# Patient Record
Sex: FEMALE | Race: ASIAN | Employment: UNEMPLOYED | URBAN - METROPOLITAN AREA
[De-identification: names, ages, dates, MRNs, and addresses within clinical notes are randomized per-mention and may not be internally consistent; named-entity substitution may affect disease eponyms.]

---

## 2016-10-17 LAB
ANTIBODY SCREEN, EXTERNAL: NEGATIVE
CHLAMYDIA, EXTERNAL: NEGATIVE
HBSAG, EXTERNAL: NEGATIVE
HCT, EXTERNAL: 41.8
HGB, EXTERNAL: 13.8
HIV, EXTERNAL: NEGATIVE
N. GONORRHEA, EXTERNAL: NEGATIVE
RPR, EXTERNAL: NORMAL
RUBELLA, EXTERNAL: NORMAL
TYPE, ABO & RH, EXTERNAL: NORMAL
URINALYSIS, EXTERNAL: NEGATIVE

## 2017-05-01 LAB — GRBS, EXTERNAL: NEGATIVE

## 2017-05-21 ENCOUNTER — HOSPITAL ENCOUNTER (OUTPATIENT)
Age: 32
Setting detail: OBSERVATION
Discharge: HOME | End: 2017-05-21
Attending: STUDENT IN AN ORGANIZED HEALTH CARE EDUCATION/TRAINING PROGRAM | Admitting: OBSTETRICS & GYNECOLOGY
Payer: COMMERCIAL

## 2017-05-21 VITALS
OXYGEN SATURATION: 100 % | SYSTOLIC BLOOD PRESSURE: 114 MMHG | RESPIRATION RATE: 17 BRPM | DIASTOLIC BLOOD PRESSURE: 79 MMHG | TEMPERATURE: 97.8 F | WEIGHT: 185 LBS | BODY MASS INDEX: 29.03 KG/M2 | HEIGHT: 67 IN | HEART RATE: 86 BPM

## 2017-05-21 PROCEDURE — 99218 HC RM OBSERVATION: CPT

## 2017-05-21 PROCEDURE — 59025 FETAL NON-STRESS TEST: CPT | Performed by: OBSTETRICS & GYNECOLOGY

## 2017-05-21 PROCEDURE — 99282 EMERGENCY DEPT VISIT SF MDM: CPT | Performed by: OBSTETRICS & GYNECOLOGY

## 2017-05-21 NOTE — IP AVS SNAPSHOT
Calixto Nam 
 
 
 [address] [place] [address] 
[phone] Patient: Karina Rick MRN: [id number] :1985 You are allergic to the following No active allergies Recent Documentation Height Weight BMI OB Status Smoking Status 1.702 m 83.9 kg 28.98 kg/m2 Pregnant Never Smoker Emergency Contacts Name Discharge Info Relation Home Work Mobile TamRadu pelayo DISCHARGE CAREGIVER [3] Spouse [3] [phone] About your hospitalization You were admitted on:  May 21, 2017 You last received care in the:  [facility] 2 LABOR & DELIVERY You were discharged on:  May 21, 2017 Unit phone number:  [phone] Why you were hospitalized Your primary diagnosis was:  Not on File Providers Seen During Your Hospitalizations Provider Role Specialty Primary office phone Kvng Walker DO Attending Provider Obstetrics & Gynecology [phone] Your Primary Care Physician (PCP) Primary Care Physician Office Phone Office Fax Danie Palmer [phone] Follow-up Information None Current Discharge Medication List  
  
ASK your doctor about these medications Dose & Instructions Dispensing Information Comments Morning Noon Evening Bedtime PNV No12-Iron-FA-DSS-OM-3 29 mg iron-1 mg -50 mg Cpkd Your last dose was: Your next dose is: Take  by mouth. Refills:  0 Discharge Instructions Precauciones en el embarazo: Instrucciones de cuidado - [ Pregnancy Precautions: Care Instructions ] Instrucciones de cuidado No hay jessee manera pedersen de prevenir el trabajo de parto antes de la fecha esperada (trabajo de parto prematuro) o de prevenir la mayoría de otros problemas en el [facility].  Dawood hay cosas que puede hacer para [address] probabilidades de tener un embarazo saludable. Vaya a jose citas, siga los consejos de jones médico y cuídese. Coma deepali y kevin ejercicio (si jones médico lo permite). Y asegúrese de edmond abundante agua. La atención de seguimiento es jessee parte clave de jones tratamiento y seguridad. Asegúrese de hacer y acudir a todas las citas, y llame a jones médico si está teniendo problemas. También es jessee buena idea saber los resultados de los exámenes y mantener jessee lista de los medicamentos que steve. Cómo puede cuidarse en el hogar? · Asegúrese de asistir a las citas prenatales. Jones médico le tomará la presión arterial en cada consulta. Jones médico también comprobará si tiene proteínas en jones orina. Tanto la presión arterial manuel randell la presencia de proteínas en la orina son señales de preeclampsia. Esta afección puede ser peligrosa tanto para usted randell para jones bebé. · Jannette abundantes líquidos, suficientes para que jones orina sea de color amarillo jose o transparente randell el agua. La deshidratación puede causar contracciones. Si tiene [facility] [employer], el corazón o el hígado y tiene que Edward's líquidos, hable con jones médico antes de aumentar jones consumo. · Notifique a jones médico de inmediato si presenta cualquier síntoma de infección, tales randell: ¨ Ardor cuando orina. ¨ Flujo con mal olor de la vagina. ¨ Comezón en la vagina. ¨ Fiebre sin explicación. ¨ Dolor o sensibilidad inusual en el útero o la parte baja del abdomen. · Aliméntese en forma equilibrada. Incluya muchos alimentos que salina ricos en calcio y elkin. ¨ Entre los alimentos ricos en calcio se incluyen la [employer], el queso, el yogur, Radha Cancel y el brócoli. ¨ Entre los alimentos ricos en elkin se incluyen las sunny aldridge, los [place], las aves, los [place], los frijoles, las uvas pasas, el pan de grano integral y las verduras de hojas verdes. · No fume.  Si necesita ayuda para dejar de fumar, hable con jones médico sobre programas y medicamentos para dejar de fumar. Estos pueden aumentar jose probabilidades de dejar el hábito para siempre. · No tyler alcohol ni use drogas ilegales. · Siga las instrucciones de jones médico acerca de la [place]. Jones médico le dirá cuánto ejercicio puede hacer. · Pregúntele a jones médico si puede tener [employer]. Si usted está en riesgo de tener trabajo de [place], jones médico podría pedirle que no tenga relaciones sexuales. · [place] precauciones para prevenir las caídas. Juan J el embarazo las articulaciones están más sueltas y se tiene menos equilibrio. Los deportes tales randell el ciclismo, el esquí o el patinaje en línea pueden aumentar el riesgo de caídas. Y no monte a andrade, sosa en motocicleta, kevin clavados, kevin esquí acuático, bucee, ni salte en paracaídas mientras está embarazada. · Evite calentarse demasiado. No use saunas ni bañeras de hidromasaje. Evite la exposición al sol en climas calientes por mucho tiempo. [place] acetaminofén (Tylenol) para bajar jessee fiebre manuel. · No tome medicamentos de venta kika, productos herbarios ni suplementos sin hablar korey con jones médico o farmacéutico. 

Cuándo debe pedir ayuda? Llame al 911 en cualquier momento que considere que necesita atención de [place]. Por ejemplo, llame si: 
· Se desmayó (perdió el conocimiento). · Tiene sangrado vaginal intenso. · Tiene dolor intenso en el vientre o la pelvis. · Le sale abundante líquido o gotea de la vagina y sabe o raman que el cordón umbilical se está saliendo a jones vagina. Si esto sucede, arrodíllese de inmediato, de cari forma que jose nalgas estén más altas que jones marlyn. [place] disminuirá la presión sobre el cordón umbilical hasta que llegue la [place]. Llame a jones médico ahora mismo o busque atención médica inmediata si: · Tiene señales de preeclampsia, tales randell: ¨ Se le hinchan de manera repentina la fantasma, las rock o los pies. ¨ Problemas nuevos con la visión (randell oscurecimiento de la visión o visión borrosa). ¨ Dolor de marlyn intenso. · Tiene cualquier sangrado vaginal. 
· Tiene dolor abdominal o cólicos. · Tiene fiebre. · Ha tenido contracciones regulares (con o sin dolor) por [place] . [place] significa que tiene 8 o más contracciones en 1 hora o que tiene 4 contracciones o más en 20 minutos después de [place] de posición y edmond líquidos. · Tiene jessee pérdida repentina de líquido por la vagina. · Tiene dolor en la parte baja de la espalda o presión en la pelvis que no desaparece. · Nota que jones bebé ha dejado de moverse o se mueve mucho menos de lo normal. 
Preste especial atención a los cambios en jones kiersten y asegúrese de comunicarse con jones médico si tiene algún problema. Dónde puede encontrar más información en inglés? Lizet Watt a [email or web address]-nola.info/. Heather Lewis Y261 en la búsqueda para aprender más acerca de \"Precauciones en el embarazo: Instrucciones de cuidado - [ Pregnancy Precautions: Care Instructions ]. \" 
Revisado: [address], 2016 Versión del contenido: 11.2 © [id number] Healthwise, Incorporated. Las instrucciones de cuidado fueron adaptadas bajo licencia por Good [employer] Connections (which disclaims liability or warranty for this information). Si usted tiene Isabella Avera afección médica o sobre estas instrucciones, siempre pregunte a jones profesional de kiersten. Healthwise, Incorporated niega toda garantía o responsabilidad por jones uso de esta información. Semana 39 de jones embarazo: Instrucciones de cuidado - [ Week 44 of Your Pregnancy: Care Instructions ] Instrucciones de cuidado Juan J estas últimas semanas, podría sentirse ansiosa por magdy a jones nuevo bebé. Los bebés recién nacidos suelen tener un aspecto distinto al que ve en fotografías o películas. Inmediatamente después del nacimiento, es posible que la marlyn tenga jessee forma extraña.  Los ojos [employer] inflamados. Y los genitales cari vez estén hinchados. Además, podrían tener la piel muy seca o marcas rojizas en los párpados, la nariz o el don. De todos modos, la mayoría de los padres creen que jose bebés son hermosos. La atención de seguimiento es jessee parte clave de jones tratamiento y seguridad. Asegúrese de hacer y acudir a todas las citas, y llame a jones médico si está teniendo problemas. También es jessee buena idea saber los resultados de los exámenes y mantener jessee lista de los medicamentos que steve. Cómo puede cuidarse en el hogar? Prepárese para amamantar · Si amamanta, siga comiendo alimentos saludables. · Evite el alcohol, los cigarrillos y las drogas. [place] incluye los medicamentos recetados y los de [employer]. · Puede ayudar a evitar dolor en los pezones alimentando a jones bebé en la posición correcta. Las [employer] ayudarán a aprender [employer]. · Jones bebé recién nacido necesitará alimentarse con jessee frecuencia de 1½ a 3 horas, aproximadamente. Elija el método anticonceptivo correcto después de que nazca el bebé · Las mujeres que están amamantando aún pueden Odella Moron. Use un método anticonceptivo si no quiere quedar embarazada. · Los dispositivos intrauterinos (DIU) son buenos para las mujeres que quieren esperar al menos 2 años antes de volver a Odella Moron. Estos dispositivos son seguros para el período de amamantamiento. · Se puede usar Depo-Provera mientras está amamantando. Es jessee inyección que se aplica cada 3 meses. · Las pastillas anticonceptivas son eficaces. [place] necesitará un tipo de pastilla distinta mientras esté amamantando. Y cuando comience a edmond estas pastillas, debe asegurarse de usar otro método anticonceptivo hasta comenzar a edmond el irvin paquete.  
· Los diafragmas, los capuchones cervicales, los implantes tubáricos y los condones (preservativos) con espermicida son Jackolyn Amber del parto. Si tiene un diafragma o un capuchón cervical, jelly tendrá que ser modificado. · Tanto la ligadura de trompas (atadura de trompas) randell la vasectomía son permanentes. Estas son Rosalina Roses opciones si está pedersen de que ya no va a querer [employer] [place] [employer] hijos. Dónde puede encontrar más información en inglés? Scooter Rodriguez a http://jasson-nola.info/. Juliette Marines J482 en la búsqueda para aprender más acerca de \"Semana 44 de jones embarazo: Instrucciones de cuidado - [ Week 44 of Your Pregnancy: Care Instructions ]. \" 
Revisado: 30 mayo, 2016 Versión del contenido: 11.2 © 5870-8482 Healthwise, Incorporated. Las instrucciones de cuidado fueron adaptadas bajo licencia por Good Help Connections (which disclaims liability or warranty for this information). Si usted tiene [place] afección médica o sobre estas instrucciones, siempre pregunte a jones profesional de kiersten. Healthwise, Incorporated niega toda garantía o responsabilidad por jones uso de esta información. [facility] Activation Thank you for enrolling in [address] Please follow the instructions below to securely access your online medical record. [facility] allows you to send messages to your doctor, view your test results, renew your prescriptions, schedule appointments, and more. How Do I Sign Up? 1. In your internet browser, go to [email or web address] 2. Click on the First Time User? Click Here link in the Sign In box. You will see the New Member Sign Up page. 3. Enter your [facility] Access Code exactly as it appears below. You will not need to use this code after youve completed the sign-up process. If you do not sign up before the expiration date, you must request a new code. [facility] Access Code: [email or web address] Expires: 6/7/2017  1:46 PM  
 
4. Enter the last four digits of your Social Security Number (xxxx) and Date of Birth (mm/dd/yyyy) as indicated and click Submit. You will be taken to the next sign-up page. 5. Create a Vishay Precision Grouphart ID. This will be your Nicholas Haddox Recordst login ID and cannot be changed, so think of one that is secure and easy to remember. 6. Create a Nicholas Haddox Recordst password. You can change your password at any time. 7. Enter your Password Reset Question and Answer. This can be used at a later time if you forget your password. 8. Enter your e-mail address. You will receive e-mail notification when new information is available in [address] 9. Click Sign Up. You can now view your medical record. Additional Information Remember, [email or web address] is NOT to be used for urgent needs. For medical emergencies, dial 911. Now available from your iPhone and Android! Discharge Orders None Introducing [facility]! [employer] introduces [email or web address] patient portal. Now you can access parts of your medical record, email your doctor's office, and request medication refills online. 1. In your internet browser, go to [email or web address] 2. Click on the First Time User? Click Here link in the Sign In box. You will see the New Member Sign Up page. 3. Enter your [email or web address] Access Code exactly as it appears below. You will not need to use this code after youve completed the sign-up process. If you do not sign up before the expiration date, you must request a new code. · [email or web address] Access Code: [email or web address] Expires: 6/7/2017  1:46 PM 
 
4. Enter the last four digits of your Social Security Number (xxxx) and Date of Birth (mm/dd/yyyy) as indicated and click Submit. You will be taken to the next sign-up page. 5. Create a Nicholas Haddox Recordst ID. This will be your [email or web address] login ID and cannot be changed, so think of one that is secure and easy to remember. 6. Create a Nicholas Haddox Recordst password. You can change your password at any time. 7. Enter your Password Reset Question and Answer. This can be used at a later time if you forget your password. 8. Enter your e-mail address.  You will receive e-mail notification when new information is available in FiPatht. 9. Click Sign Up. You can now view and download portions of your medical record. 10. Click the Download Summary menu link to download a portable copy of your medical information. If you have questions, please visit the Frequently Asked Questions section of the [employer] website. Remember, [employer] is NOT to be used for urgent needs. For medical emergencies, dial 911. Now available from your iPhone and Android! General Information Please provide this summary of care documentation to your next provider. Patient Signature:  ____________________________________________________________ Date:  ____________________________________________________________  
  
[facility] Provider Signature:  ____________________________________________________________ Date:  ____________________________________________________________

## 2017-05-21 NOTE — IP AVS SNAPSHOT
Current Discharge Medication List  
  
ASK your doctor about these medications Dose & Instructions Dispensing Information Comments Morning Noon Evening Bedtime PNV No12-Iron-FA-DSS-OM-3 29 mg iron-1 mg -50 mg Cpkd Your last dose was: Your next dose is: Take  by mouth. Refills:  0

## 2017-05-22 ENCOUNTER — ANESTHESIA EVENT (OUTPATIENT)
Dept: LABOR AND DELIVERY | Age: 32
End: 2017-05-22
Payer: COMMERCIAL

## 2017-05-22 ENCOUNTER — HOSPITAL ENCOUNTER (INPATIENT)
Age: 32
LOS: 3 days | Discharge: HOME | End: 2017-05-25
Attending: OBSTETRICS & GYNECOLOGY | Admitting: OBSTETRICS & GYNECOLOGY
Payer: COMMERCIAL

## 2017-05-22 ENCOUNTER — ANESTHESIA (OUTPATIENT)
Dept: LABOR AND DELIVERY | Age: 32
End: 2017-05-22
Payer: COMMERCIAL

## 2017-05-22 ENCOUNTER — HOSPITAL ENCOUNTER (OUTPATIENT)
Age: 32
Setting detail: OBSERVATION
Discharge: HOME | End: 2017-05-22
Attending: STUDENT IN AN ORGANIZED HEALTH CARE EDUCATION/TRAINING PROGRAM | Admitting: OBSTETRICS & GYNECOLOGY
Payer: COMMERCIAL

## 2017-05-22 VITALS
OXYGEN SATURATION: 99 % | HEIGHT: 67 IN | WEIGHT: 185 LBS | BODY MASS INDEX: 29.03 KG/M2 | DIASTOLIC BLOOD PRESSURE: 78 MMHG | RESPIRATION RATE: 12 BRPM | TEMPERATURE: 98.3 F | HEART RATE: 89 BPM | SYSTOLIC BLOOD PRESSURE: 120 MMHG

## 2017-05-22 PROBLEM — Z34.90 PREGNANCY: Status: ACTIVE | Noted: 2017-05-22

## 2017-05-22 LAB
BASOPHILS # BLD AUTO: 0 K/UL (ref 0–0.1)
BASOPHILS # BLD AUTO: 0 K/UL (ref 0–0.1)
BASOPHILS # BLD: 0 % (ref 0–1)
BASOPHILS # BLD: 0 % (ref 0–1)
EOSINOPHIL # BLD: 0 K/UL (ref 0–0.4)
EOSINOPHIL # BLD: 0 K/UL (ref 0–0.4)
EOSINOPHIL NFR BLD: 0 % (ref 0–7)
EOSINOPHIL NFR BLD: 0 % (ref 0–7)
ERYTHROCYTE [DISTWIDTH] IN BLOOD BY AUTOMATED COUNT: 13 % (ref 11.5–14.5)
ERYTHROCYTE [DISTWIDTH] IN BLOOD BY AUTOMATED COUNT: 13.3 % (ref 11.5–14.5)
HCT VFR BLD AUTO: 38.2 % (ref 35–47)
HCT VFR BLD AUTO: 38.5 % (ref 35–47)
HGB BLD-MCNC: 13.1 G/DL (ref 11.5–16)
HGB BLD-MCNC: 13.3 G/DL (ref 11.5–16)
LYMPHOCYTES # BLD AUTO: 11 % (ref 12–49)
LYMPHOCYTES # BLD AUTO: 8 % (ref 12–49)
LYMPHOCYTES # BLD: 1.2 K/UL (ref 0.8–3.5)
LYMPHOCYTES # BLD: 1.3 K/UL (ref 0.8–3.5)
MCH RBC QN AUTO: 33.3 PG (ref 26–34)
MCH RBC QN AUTO: 34.5 PG (ref 26–34)
MCHC RBC AUTO-ENTMCNC: 34 G/DL (ref 30–36.5)
MCHC RBC AUTO-ENTMCNC: 34.8 G/DL (ref 30–36.5)
MCV RBC AUTO: 98 FL (ref 80–99)
MCV RBC AUTO: 99.2 FL (ref 80–99)
MONOCYTES # BLD: 0.5 K/UL (ref 0–1)
MONOCYTES # BLD: 0.7 K/UL (ref 0–1)
MONOCYTES NFR BLD AUTO: 4 % (ref 5–13)
MONOCYTES NFR BLD AUTO: 5 % (ref 5–13)
NEUTS SEG # BLD: 10.6 K/UL (ref 1.8–8)
NEUTS SEG # BLD: 12.9 K/UL (ref 1.8–8)
NEUTS SEG NFR BLD AUTO: 85 % (ref 32–75)
NEUTS SEG NFR BLD AUTO: 87 % (ref 32–75)
PLATELET # BLD AUTO: 260 K/UL (ref 150–400)
PLATELET # BLD AUTO: 263 K/UL (ref 150–400)
RBC # BLD AUTO: 3.85 M/UL (ref 3.8–5.2)
RBC # BLD AUTO: 3.93 M/UL (ref 3.8–5.2)
WBC # BLD AUTO: 12.4 K/UL (ref 3.6–11)
WBC # BLD AUTO: 14.8 K/UL (ref 3.6–11)

## 2017-05-22 PROCEDURE — 36415 COLL VENOUS BLD VENIPUNCTURE: CPT | Performed by: OBSTETRICS & GYNECOLOGY

## 2017-05-22 PROCEDURE — 74011250636 HC RX REV CODE- 250/636: Performed by: ANESTHESIOLOGY

## 2017-05-22 PROCEDURE — 96361 HYDRATE IV INFUSION ADD-ON: CPT

## 2017-05-22 PROCEDURE — 96374 THER/PROPH/DIAG INJ IV PUSH: CPT

## 2017-05-22 PROCEDURE — 10907ZC DRAINAGE OF AMNIOTIC FLUID, THERAPEUTIC FROM PRODUCTS OF CONCEPTION, VIA NATURAL OR ARTIFICIAL OPENING: ICD-10-PCS | Performed by: OBSTETRICS & GYNECOLOGY

## 2017-05-22 PROCEDURE — 74011000258 HC RX REV CODE- 258: Performed by: OBSTETRICS & GYNECOLOGY

## 2017-05-22 PROCEDURE — 99283 EMERGENCY DEPT VISIT LOW MDM: CPT | Performed by: STUDENT IN AN ORGANIZED HEALTH CARE EDUCATION/TRAINING PROGRAM

## 2017-05-22 PROCEDURE — 00HU33Z INSERTION OF INFUSION DEVICE INTO SPINAL CANAL, PERCUTANEOUS APPROACH: ICD-10-PCS | Performed by: ANESTHESIOLOGY

## 2017-05-22 PROCEDURE — 96376 TX/PRO/DX INJ SAME DRUG ADON: CPT

## 2017-05-22 PROCEDURE — 74011250636 HC RX REV CODE- 250/636

## 2017-05-22 PROCEDURE — 74011000250 HC RX REV CODE- 250: Performed by: ANESTHESIOLOGY

## 2017-05-22 PROCEDURE — 75410000002 HC LABOR FEE PER 1 HR: Performed by: OBSTETRICS & GYNECOLOGY

## 2017-05-22 PROCEDURE — 99218 HC RM OBSERVATION: CPT

## 2017-05-22 PROCEDURE — 74011250636 HC RX REV CODE- 250/636: Performed by: OBSTETRICS & GYNECOLOGY

## 2017-05-22 PROCEDURE — 65270000029 HC RM PRIVATE

## 2017-05-22 PROCEDURE — 74011000250 HC RX REV CODE- 250

## 2017-05-22 PROCEDURE — 85025 COMPLETE CBC W/AUTO DIFF WBC: CPT | Performed by: OBSTETRICS & GYNECOLOGY

## 2017-05-22 PROCEDURE — 77030014125 HC TY EPDRL BBMI -B: Performed by: ANESTHESIOLOGY

## 2017-05-22 PROCEDURE — 3E0R3CZ INTRODUCE REGIONAL ANESTH IN SPINAL CANAL, PERC: ICD-10-PCS | Performed by: ANESTHESIOLOGY

## 2017-05-22 PROCEDURE — 74011250637 HC RX REV CODE- 250/637: Performed by: OBSTETRICS & GYNECOLOGY

## 2017-05-22 PROCEDURE — 96375 TX/PRO/DX INJ NEW DRUG ADDON: CPT

## 2017-05-22 RX ORDER — ACETAMINOPHEN 325 MG/1
975 TABLET ORAL
Status: DISCONTINUED | OUTPATIENT
Start: 2017-05-22 | End: 2017-05-23

## 2017-05-22 RX ORDER — NALOXONE HYDROCHLORIDE 0.4 MG/ML
0.4 INJECTION, SOLUTION INTRAMUSCULAR; INTRAVENOUS; SUBCUTANEOUS AS NEEDED
Status: DISCONTINUED | OUTPATIENT
Start: 2017-05-22 | End: 2017-05-22 | Stop reason: HOSPADM

## 2017-05-22 RX ORDER — LIDOCAINE HYDROCHLORIDE AND EPINEPHRINE 20; 5 MG/ML; UG/ML
INJECTION, SOLUTION EPIDURAL; INFILTRATION; INTRACAUDAL; PERINEURAL AS NEEDED
Status: DISCONTINUED | OUTPATIENT
Start: 2017-05-22 | End: 2017-05-23 | Stop reason: HOSPADM

## 2017-05-22 RX ORDER — SODIUM CHLORIDE 0.9 % (FLUSH) 0.9 %
5-10 SYRINGE (ML) INJECTION AS NEEDED
Status: DISCONTINUED | OUTPATIENT
Start: 2017-05-22 | End: 2017-05-22 | Stop reason: HOSPADM

## 2017-05-22 RX ORDER — SODIUM CHLORIDE 0.9 % (FLUSH) 0.9 %
5-10 SYRINGE (ML) INJECTION AS NEEDED
Status: DISCONTINUED | OUTPATIENT
Start: 2017-05-22 | End: 2017-05-23

## 2017-05-22 RX ORDER — FENTANYL/BUPIVACAINE/NS/PF 2-1250MCG
1-16 PREFILLED PUMP RESERVOIR EPIDURAL CONTINUOUS
Status: DISCONTINUED | OUTPATIENT
Start: 2017-05-22 | End: 2017-05-22 | Stop reason: HOSPADM

## 2017-05-22 RX ORDER — OXYTOCIN IN 5 % DEXTROSE 30/500 ML
.5-2 PLASTIC BAG, INJECTION (ML) INTRAVENOUS
Status: DISCONTINUED | OUTPATIENT
Start: 2017-05-22 | End: 2017-05-23

## 2017-05-22 RX ORDER — SODIUM CHLORIDE, SODIUM LACTATE, POTASSIUM CHLORIDE, CALCIUM CHLORIDE 600; 310; 30; 20 MG/100ML; MG/100ML; MG/100ML; MG/100ML
125 INJECTION, SOLUTION INTRAVENOUS CONTINUOUS
Status: DISCONTINUED | OUTPATIENT
Start: 2017-05-22 | End: 2017-05-23

## 2017-05-22 RX ORDER — OXYTOCIN IN 5 % DEXTROSE 30/500 ML
PLASTIC BAG, INJECTION (ML) INTRAVENOUS
Status: COMPLETED
Start: 2017-05-22 | End: 2017-05-22

## 2017-05-22 RX ORDER — SODIUM CHLORIDE, SODIUM LACTATE, POTASSIUM CHLORIDE, CALCIUM CHLORIDE 600; 310; 30; 20 MG/100ML; MG/100ML; MG/100ML; MG/100ML
125 INJECTION, SOLUTION INTRAVENOUS CONTINUOUS
Status: DISCONTINUED | OUTPATIENT
Start: 2017-05-22 | End: 2017-05-22 | Stop reason: HOSPADM

## 2017-05-22 RX ORDER — OXYTOCIN IN 5 % DEXTROSE 30/500 ML
.5-2 PLASTIC BAG, INJECTION (ML) INTRAVENOUS
Status: DISCONTINUED | OUTPATIENT
Start: 2017-05-22 | End: 2017-05-22

## 2017-05-22 RX ORDER — NALOXONE HYDROCHLORIDE 0.4 MG/ML
0.4 INJECTION, SOLUTION INTRAMUSCULAR; INTRAVENOUS; SUBCUTANEOUS AS NEEDED
Status: DISCONTINUED | OUTPATIENT
Start: 2017-05-22 | End: 2017-05-23

## 2017-05-22 RX ORDER — FENTANYL/BUPIVACAINE/NS/PF 2-1250MCG
1-16 PREFILLED PUMP RESERVOIR EPIDURAL CONTINUOUS
Status: DISCONTINUED | OUTPATIENT
Start: 2017-05-22 | End: 2017-05-23

## 2017-05-22 RX ORDER — BUTORPHANOL TARTRATE 2 MG/ML
2 INJECTION INTRAMUSCULAR; INTRAVENOUS
Status: DISCONTINUED | OUTPATIENT
Start: 2017-05-22 | End: 2017-05-22 | Stop reason: HOSPADM

## 2017-05-22 RX ADMIN — Medication 2 MILLI-UNITS/MIN: at 22:05

## 2017-05-22 RX ADMIN — SODIUM CHLORIDE, SODIUM LACTATE, POTASSIUM CHLORIDE, AND CALCIUM CHLORIDE 999 ML/HR: 600; 310; 30; 20 INJECTION, SOLUTION INTRAVENOUS at 16:46

## 2017-05-22 RX ADMIN — ACETAMINOPHEN 975 MG: 325 TABLET ORAL at 22:16

## 2017-05-22 RX ADMIN — LIDOCAINE HYDROCHLORIDE AND EPINEPHRINE 10 ML: 20; 5 INJECTION, SOLUTION EPIDURAL; INFILTRATION; INTRACAUDAL; PERINEURAL at 18:24

## 2017-05-22 RX ADMIN — FENTANYL 0.2 MG/100ML-BUPIV 0.125%-NACL 0.9% EPIDURAL INJ 12 ML/HR: 2/0.125 SOLUTION at 18:23

## 2017-05-22 RX ADMIN — EPHEDRINE SULFATE 10 MG: 50 INJECTION INTRAMUSCULAR; INTRAVENOUS; SUBCUTANEOUS at 19:08

## 2017-05-22 RX ADMIN — PROMETHAZINE HYDROCHLORIDE 12.5 MG: 25 INJECTION INTRAMUSCULAR; INTRAVENOUS at 01:40

## 2017-05-22 RX ADMIN — BUTORPHANOL TARTRATE 2 MG: 2 INJECTION, SOLUTION INTRAMUSCULAR; INTRAVENOUS at 05:07

## 2017-05-22 RX ADMIN — SODIUM CHLORIDE, SODIUM LACTATE, POTASSIUM CHLORIDE, AND CALCIUM CHLORIDE 125 ML/HR: 600; 310; 30; 20 INJECTION, SOLUTION INTRAVENOUS at 01:30

## 2017-05-22 RX ADMIN — BUTORPHANOL TARTRATE 2 MG: 2 INJECTION, SOLUTION INTRAMUSCULAR; INTRAVENOUS at 01:38

## 2017-05-22 NOTE — DISCHARGE INSTRUCTIONS
Precauciones en el embarazo: Instrucciones de cuidado - [ Pregnancy Precautions: Care Instructions ]  Instrucciones de cuidado  No hay jessee manera pedersen de prevenir el trabajo de parto antes de la fecha esperada (trabajo de parto prematuro) o de prevenir la mayoría de otros problemas en el [facility]. [place] hay cosas que puede hacer para aumentar las probabilidades de tener un embarazo saludable. Vaya a jose citas, siga los consejos de jones médico y cuídese. Coma deepali y kevin ejercicio (si jones médico lo permite). Y asegúrese de edmond abundante agua. La atención de seguimiento es jessee parte clave de jones tratamiento y seguridad. Asegúrese de hacer y acudir a todas las citas, y llame a jones médico si está teniendo problemas. También es jessee buena idea saber los resultados de los exámenes y mantener jessee lista de los medicamentos que steve. ¿Cómo puede cuidarse en el hogar? · Asegúrese de asistir a las citas prenatales. Jones médico le tomará la presión arterial en cada consulta. Jones médico también comprobará si tiene proteínas en jones orina. Tanto la presión arterial manuel randell la presencia de proteínas en la orina son señales de preeclampsia. Esta afección puede ser peligrosa tanto para usted randell para jones bebé. · Jannette abundantes líquidos, suficientes para que jones orina sea de color amarillo jose o transparente randell el agua. La deshidratación puede causar contracciones. Si tiene [employer], el corazón o el hígado y tiene que [place]'s líquidos, hable con jones médico antes de aumentar jones consumo. · Notifique a jones médico de inmediato si presenta cualquier síntoma de infección, tales randell:  ¨ Ardor cuando orina. ¨ Flujo con mal olor de la vagina. ¨ Comezón en la vagina. ¨ Fiebre sin explicación. ¨ Dolor o sensibilidad inusual en el útero o la parte baja del abdomen. · Aliméntese en forma equilibrada. Incluya muchos alimentos que salina ricos en calcio y elkin.   ¨ Entre los alimentos ricos en calcio se incluyen la [place], el queso, el yogur, Estefani Most y el brócoli. ¨ Entre los alimentos ricos en elkin se incluyen las sunny [place]dridge, los [place], las aves, los [place], los frijoles, las uvas pasas, el pan de grano integral y las verduras de hojas verdes. · No fume. Si necesita ayuda para dejar de fumar, hable con jones médico sobre programas y medicamentos para dejar de fumar. Estos pueden aumentar jose probabilidades de dejar el hábito para siempre. · No tyler alcohol ni use drogas ilegales. · Siga las instrucciones de jones médico acerca de la [place]. Jones médico le dirá cuánto ejercicio puede hacer. · Pregúntele a jones médico si puede tener [employer]. Si usted está en riesgo de tener trabajo de [place], jones médico podría pedirle que no tenga relaciones sexuales. · [place] precauciones para prevenir las caídas. Juan J el embarazo las articulaciones están más sueltas y se tiene menos equilibrio. Los deportes tales randell el ciclismo, el esquí o el patinaje en línea pueden aumentar el riesgo de caídas. Y no monte a andrade, sosa en motocicleta, kevin clavados, kevin esquí acuático, bucee, ni salte en paracaídas mientras está embarazada. · Evite calentarse demasiado. No use saunas ni bañeras de hidromasaje. Evite la exposición al sol en climas calientes por mucho tiempo. [place] acetaminofén (Tylenol) para bajar jessee fiebre manuel. · No tome medicamentos de venta kika, productos herbarios ni suplementos sin hablar korey con jones médico o farmacéutico.  ¿Cuándo debe pedir ayuda? Llame al 911 en cualquier momento que considere que necesita atención de [place]. Por ejemplo, llame si:  · Se desmayó (perdió el conocimiento). · Tiene sangrado vaginal intenso. · Tiene dolor intenso en el vientre o la pelvis. · Le sale abundante líquido o gotea de la vagina y sabe o raman que el cordón umbilical se está saliendo a jones vagina.  Si esto sucede, arrodíllese de inmediato, de cari forma que jose nalgas estén más altas que jones marlyn. [place] disminuirá la presión sobre el cordón umbilical hasta que llegue la [place]. Llame a jones médico ahora mismo o busque atención médica inmediata si:  · Tiene señales de preeclampsia, tales randell:  ¨ Se le hinchan de manera repentina la fantasma, las rock o los pies. ¨ Problemas nuevos con la visión (randell oscurecimiento de la visión o visión borrosa). ¨ Dolor de marlyn intenso. · Tiene cualquier sangrado vaginal.  · Tiene dolor abdominal o cólicos. · Tiene fiebre. · Ha tenido contracciones regulares (con o sin dolor) por Renella Romberg. Raoul significa que tiene 8 o más contracciones en 1 hora o que tiene 4 contracciones o más en 20 minutos después de [place] de posición y edmond líquidos. · Tiene jessee pérdida repentina de líquido por la vagina. · Tiene dolor en la parte baja de la espalda o presión en la pelvis que no desaparece. · Nota que jones bebé ha dejado de moverse o se mueve mucho menos de lo normal.  Preste especial atención a los cambios en jones kiersten y asegúrese de comunicarse con jones médico si tiene algún problema. ¿Dónde puede encontrar más información en inglés? Anirudh Medrano a [email or web address]-nola.info/. Katy Landry [id number] en la búsqueda para aprender más acerca de \"Precauciones en el embarazo: Instrucciones de cuidado - [ Pregnancy Precautions: Care Instructions ]. \"  Revisado: 30 cabraels, 2016  Versión del contenido: 11.2  © 5236-3738 [employer]. Las instrucciones de cuidado fueron adaptadas bajo licencia por [employer] (which disclaims liability or warranty for this information). Si usted tiene [place] afección médica o sobre estas instrucciones, siempre pregunte a jones profesional de kiersten. [facility] niega toda garantía o responsabilidad por jones uso de esta información. Semana 39 de jones embarazo:  Instrucciones de cuidado - [ Week 44 of Your Pregnancy: Care Instructions ]  Instrucciones de cuidado    Juan J estas últimas semanas, podría sentirse ansiosa por magdy a jones nuevo bebé. Los bebés recién nacidos suelen tener un aspecto distinto al que ve en fotografías o películas. Inmediatamente después del nacimiento, es posible que la marlyn tenga jessee forma extraña. Los ojos podrían estar inflamados. Y los genitales cari vez estén hinchados. Además, podrían tener la piel muy seca o marcas rojizas en los párpados, la nariz o el don. De todos modos, la mayoría de los padres creen que jose bebés son hermosos. La atención de seguimiento es jessee parte clave de jones tratamiento y seguridad. Asegúrese de hacer y acudir a todas las citas, y llame a jones médico si está teniendo problemas. También es jessee buena idea saber los resultados de los exámenes y mantener jessee lista de los medicamentos que steve. ¿Cómo puede cuidarse en el hogar? Prepárese para amamantar  · Si amamanta, siga comiendo alimentos saludables. · Evite el alcohol, los cigarrillos y las drogas. [place] incluye los medicamentos recetados y los de [employer]. · Puede ayudar a evitar dolor en los pezones alimentando a jones bebé en la posición correcta. Las [place] ayudarán a aprender [employer]. · Jones bebé recién nacido necesitará alimentarse con jessee frecuencia de 1½ a 3 horas, aproximadamente. Elija el método anticonceptivo correcto después de que nazca el bebé  · Las mujeres que están amamantando aún pueden Kriste Gemma. Use un método anticonceptivo si no quiere quedar embarazada. · Los dispositivos intrauterinos (DIU) son buenos para las mujeres que quieren esperar al menos 2 años antes de volver a Kriste Gemma. Estos dispositivos son seguros para el período de amamantamiento. · Se puede usar Depo-Provera mientras está amamantando. Es jessee inyección que se aplica cada 3 meses. · Las pastillas anticonceptivas son eficaces. [place] necesitará un tipo de pastilla distinta mientras esté amamantando.  Y cuando comience a edmond estas pastillas, debe asegurarse de usar [employer] anticonceptivo hasta comenzar a edmond el irvin paquete. · Los diafragmas, los capuchones cervicales, los implantes tubáricos y los condones (preservativos) con espermicida son menos eficaces después del parto. Si tiene un diafragma o un capuchón cervical, jelly tendrá que ser modificado. · Tanto la ligadura de trompas (atadura de trompas) randell la vasectomía son permanentes. Estas son Laquetta Good opciones si está pedersen de que ya no va a querer [facility]. ¿Dónde puede encontrar más información en inglés? Lisy Coronaers a http://jasson-nola.info/. Remberto Steward [id number] en la búsqueda para aprender más acerca de \"Semana 44 de jones embarazo: Instrucciones de cuidado - [ Week 44 of Your Pregnancy: Care Instructions ]. \"  Revisado: 30 cabrales, 2016  Versión del contenido: 11.2  © 1812-0483 Healthwise, Incorporated. Las instrucciones de cuidado fueron adaptadas bajo licencia por [employer] (which disclaims liability or warranty for this information). Si usted tiene [place] afección médica o sobre estas instrucciones, siempre pregunte a jones profesional de kiersten. Healthwise, Incorporated niega toda garantía o responsabilidad por jones uso de esta información. [employer] Activation    Thank you for enrolling in [address] Please follow the instructions below to securely access your online medical record. [employer] allows you to send messages to your doctor, view your test results, renew your prescriptions, schedule appointments, and more. How Do I Sign Up? 1. In your internet browser, go to [email or web address]. 2. Click on the First Time User? Click Here link in the Sign In box. You will see the New Member Sign Up page. 3. Enter your [employer] Access Code exactly as it appears below. You will not need to use this code after youve completed the sign-up process. If you do not sign up before the expiration date, you must request a new code.     [employer] Access Code: [email or web address]  Expires: 6/7/2017  1:46 PM     4. Enter the last four digits of your Social Security Number (xxxx) and Date of Birth (mm/dd/yyyy) as indicated and click Submit. You will be taken to the next sign-up page. 5. Create a Videostrip ID. This will be your Videostrip login ID and cannot be changed, so think of one that is secure and easy to remember. 6. Create a Vide[employer]rip password. You can change your password at any time. 7. Enter your Password Reset Question and Answer. This can be used at a later time if you forget your password. 8. Enter your e-mail address. You will receive e-mail notification when new information is available in [address] 9. Click Sign Up. You can now view your medical record. Additional Information    Remember, Videostrip is NOT to be used for urgent needs. For medical emergencies, dial 911. Now available from your iPhone and Android!

## 2017-05-22 NOTE — H&P
History & Physical    Name: Leonor Phoenix MRN: [id number]  SSN: [id number]    YOB: 1985  Age: 32 y.o. Sex: female        Subjective:     Estimated Date of Delivery: 17  OB History      Para Term  AB TAB SAB Ectopic Multiple Living    2 0   1  1             Ms. Clydia Lennox presents with pregnancy at 39w2d for increased abdominal pain. She reports having gone to a festival today. She states uterine contractions every 4 mins, denies any vaginal bleeding, leakage of fluid and reports +FM. Prenatal course was normal. Please see prenatal records for details. No past medical history on file. Past Surgical History:   Procedure Laterality Date    BREAST SURGERY PROCEDURE UNLISTED      breast implants     HX TONSILLECTOMY Bilateral      Social History     Occupational History    Not on file. Social History Main Topics    Smoking status: Never Smoker    Smokeless tobacco: Never Used    Alcohol use No    Drug use: No    Sexual activity: Yes     Partners: Male     Birth control/ protection: None     No family history on file. No Known Allergies  Prior to Admission medications    Medication Sig Start Date End Date Taking? Authorizing Provider   PNV No12-Iron-FA-DSS-OM-3 29 mg iron-1 mg -50 mg CPKD Take  by mouth. Yes Historical Provider        A comprehensive review of systems was negative except for that written in the HPI.     Objective:     Vitals:  Vitals:    17 1940   Weight: 83.9 kg (185 lb)   Height: 5' 7\" (1.702 m)        Physical Exam  Gen: NAD  Pulm: CTA b/L  CV: S1S2 RRRR  Abd: Gravid, soft, NT  Pelvic: No vaginal bleeding  Cervical Exam: 3 cm dilated, 65%effaced, -1 fetal station  Uterine Activity: irregular, mild  Membranes: Intact  Fetal Heart Rate: Reactive     Prenatal Labs:   No results found for: RUBELLAEXT, GRBSEXT, HBSAGEXT, HIVEXT, RPREXT, GONNOEXT, CHLAMEXT     Impression/Plan: 39.2weeks gestation, abdominal pain related to 3rd trimester Plan: Observation. PO hydration. Reassess in 1 hr. If cervix unchanged; give labor precaution. Return to hospital once in active phase of labor. Maintain scheduled OB appointment.      Signed By:  Darrel Davila MD     May 21, 2017

## 2017-05-22 NOTE — IP AVS SNAPSHOT
Daniela Daly 
 
 
 [address] 
[phone] Patient: Zac De Leon MRN: [id number] :1985 You are allergic to the following No active allergies Recent Documentation Height Weight Breastfeeding? BMI OB Status Smoking Status 1.702 m 83.9 kg No 28.98 kg/m2 Pregnant Never Smoker Unresulted Labs Order Current Status SAMPLE TO BLOOD BANK In process Emergency Contacts Name Discharge Info Relation Home Work Mobile Radu Patrick DISCHARGE CAREGIVER [3] Spouse [3] [phone] About your hospitalization You were admitted on:  May 22, 2017 You last received care in the:  [facility] 2 LABOR & DELIVERY You were discharged on:  May 22, 2017 Unit phone number:  [phone] Why you were hospitalized Your primary diagnosis was:  Not on File Your diagnoses also included:  Pregnancy Providers Seen During Your Hospitalizations Provider Role Specialty Primary office phone Jeremiah Aguila DO Attending Provider Obstetrics & Gynecology [phone] Your Primary Care Physician (PCP) Primary Care Physician Office Phone Office Fax Javon Meza [phone] Follow-up Information Follow up With Details Comments Contact Info Darren Grady MD    [place] [address] 
[phone] Current Discharge Medication List  
  
ASK your doctor about these medications Dose & Instructions Dispensing Information Comments Morning Noon Evening Bedtime IRON (FERROUS SULFATE) PO Your last dose was: Your next dose is:    
   
   
 Dose:  1 Each Take 1 Each by mouth daily. Refills:  0  
     
   
   
   
  
 PNV No12-Iron-FA-DSS-OM-3 29 mg iron-1 mg -50 mg Cpkd Your last dose was: Your next dose is: Take  by mouth. Refills:  0 Discharge Instructions Week 40 of Your Pregnancy: Care Instructions Your Care Instructions By week 36, you have reached your due date. Your baby could be coming any day. But it's a good idea to think ahead to the next few weeks and what might happen. If this is your first time having a baby, try not to worry. If you don't start labor on your own by 41 or 42 weeks, your doctor may recommend giving you medicines to start labor. This care sheet gives you information about how labor can be started. It also gives you some ideas about breathing exercises you can do if you start to feel anxious or if you are trying to relax. Follow-up care is a key part of your treatment and safety. Be sure to make and go to all appointments, and call your doctor if you are having problems. It's also a good idea to know your test results and keep a list of the medicines you take. How can you care for yourself at home? Learn how labor can be started · If you and your baby are both healthy and ready, and if your cervix has started to open, your doctor may \"break your water\" (rupture the amniotic sac). This often starts labor. · If your cervix is not quite ready, you may get a medicine called Pitocin through an IV to start contractions. · If your cervix is still very firm, you may have prostaglandin tablets (misoprostol) placed in your vagina to soften the cervix. Try guided imagery to help you relax · Find a comfortable place to sit or lie down. Close your eyes. · Start by just taking a few deep breaths to help you relax. · Picture a setting that is calm and peaceful. This could be a beach, a mountain setting, a meadow, or a scene that you choose. · Imagine your scene, and try to add some detail. For example, is there a breeze? What does the bryan look like? Is it clear, or are there clouds? · It often helps to add a path to your scene.  For example, as you enter the meadow, imagine a path leading you through the meadow to the trees on the other side. As you follow the path farther into the [facility] you feel more and more relaxed. · When you are deep into your scene and are feeling relaxed, take a few minutes to breathe slowly and feel the calm. · When you are ready, slowly take yourself out of the scene back to the present. Tell yourself that you will feel relaxed and refreshed and will bring that sense of calm with you. · Count to 3, and open your eyes. Where can you learn more? Go to http://jasson-nola.info/. Enter C983 in the search box to learn more about \"Week 40 of Your Pregnancy: Care Instructions. \" Current as of: May 30, 2016 Content Version: 11.2 © 7531-3742 [employer]. Care instructions adapted under license by [employer] (which disclaims liability or warranty for this information). If you have questions about a medical condition or this instruction, always ask your healthcare professional. [place] [address] any warranty or liability for your use of this information. Pregnancy Precautions: Care Instructions Your Care Instructions There is no sure way to prevent labor before your due date ( labor) or to prevent most other pregnancy problems. But there are things you can do to increase your chances of a healthy pregnancy. Go to your appointments, follow your doctor's advice, and take good care of yourself. Eat well, and exercise (if your doctor agrees). And make sure to drink plenty of water. Follow-up care is a key part of your treatment and safety. Be sure to make and go to all appointments, and call your doctor if you are having problems. It's also a good idea to know your test results and keep a list of the medicines you take. How can you care for yourself at home? · Make sure you go to your prenatal appointments.  At each visit, your doctor will check your blood pressure. Your doctor will also check to see if you have protein in your urine. High blood pressure and protein in urine are signs of preeclampsia. This condition can be dangerous for you and your baby. · Drink plenty of fluids, enough so that your urine is light yellow or clear like water. Dehydration can cause contractions. If you have kidney, heart, or liver disease and have to limit fluids, talk with your doctor before you increase the amount of fluids you drink. · Tell your doctor right away if you notice any symptoms of an infection, such as: ¨ Burning when you urinate. ¨ A foul-smelling discharge from your vagina. ¨ Vaginal itching. ¨ Unexplained fever. ¨ Unusual pain or soreness in your uterus or lower belly. · Eat a balanced diet. Include plenty of foods that are high in calcium and iron. ¨ Foods high in calcium include milk, cheese, yogurt, almonds, and broccoli. ¨ Foods high in iron include red meat, shellfish, poultry, eggs, beans, raisins, whole-grain bread, and leafy green vegetables. · Do not smoke. If you need help quitting, talk to your doctor about stop-smoking programs and medicines. These can increase your chances of quitting for good. · Do not drink alcohol or use illegal drugs. · Follow your doctor's directions about activity. Your doctor will let you know how much, if any, exercise you can do. · Ask your doctor if you can have sex. If you are at risk for early labor, your doctor may ask you to not have sex. · Take care to prevent falls. During pregnancy, your joints are loose, and your balance is off. Sports such as bicycling, skiing, or in-line skating can increase your risk of falling. And don't ride horses or motorcycles, dive, water ski, scuba dive, or parachute jump while you are pregnant. · Avoid getting very hot. Do not use saunas or hot tubs. Avoid staying out in the sun in hot weather for long periods.  Take acetaminophen (Tylenol) to lower a high fever. · Do not take any over-the-counter or herbal medicines or supplements without talking to your doctor or pharmacist first. 
When should you call for help? Call 911 anytime you think you may need emergency care. For example, call if: 
· You passed out (lost consciousness). · You have severe vaginal bleeding. · You have severe pain in your belly or pelvis. · You have had fluid gushing or leaking from your vagina and you know or think the umbilical cord is bulging into your vagina. If this happens, immediately get down on your knees so your rear end (buttocks) is higher than your head. This will decrease the pressure on the cord until help arrives. Call your doctor now or seek immediate medical care if: 
· You have signs of preeclampsia, such as: 
¨ Sudden swelling of your face, hands, or feet. ¨ New vision problems (such as dimness or blurring). ¨ A severe headache. · You have any vaginal bleeding. · You have belly pain or cramping. · You have a fever. · You have had regular contractions (with or without pain) for an hour. This means that you have 8 or more within 1 hour or 4 or more in 20 minutes after you change your position and drink fluids. · You have a sudden release of fluid from your vagina. · You have low back pain or pelvic pressure that does not go away. · You notice that your baby has stopped moving or is moving much less than normal. 
Watch closely for changes in your health, and be sure to contact your doctor if you have any problems. Where can you learn more? Go to http://jasson-nola.info/. Enter [id number] in the search box to learn more about \"Pregnancy Precautions: Care Instructions. \" Current as of: May 30, 2016 Content Version: 11.2 © 7707-9602 [email or web address].  Care instructions adapted under license by [employer] (which disclaims liability or warranty for this information). If you have questions about a medical condition or this instruction, always ask your healthcare professional. [address] any warranty or liability for your use of this information. Counting Your Baby's Kicks: Care Instructions Your Care Instructions Counting your baby's kicks is one way your doctor can tell that your baby is healthy. Most womenespecially in a first pregnancyfeel their baby move for the first time between 16 and 22 weeks. The movement may feel like flutters rather than kicks. Your baby may move more at certain times of the day. When you are active, you may notice less kicking than when you are resting. At your prenatal visits, your doctor will ask whether the baby is active. In your last trimester, your doctor may ask you to count the number of times you feel your baby move. Follow-up care is a key part of your treatment and safety. Be sure to make and go to all appointments, and call your doctor if you are having problems. It's also a good idea to know your test results and keep a list of the medicines you take. How do you count fetal kicks? · A common method of checking your baby's movement is to count the number of kicks or moves you feel in 1 hour. Ten movements (such as kicks, flutters, or rolls) in 1 hour are normal. Some doctors suggest that you count in the morning until you get to 10 movements. Then you can quit for that day and start again the next day. · Pick your baby's most active time of day to count. This may be any time from morning to evening. · If you do not feel 10 movements in an hour, your baby may be sleeping. Wait for the next hour and count again. When should you call for help?  
Call your doctor now or seek immediate medical care if: 
· You noticed that your baby has stopped moving or is moving much less than normal. 
Watch closely for changes in your health, and be sure to contact your doctor if you have any problems. Where can you learn more? Go to http://jasson-nola.info/. Enter R588 in the search box to learn more about \"Counting Your Baby's Kicks: Care Instructions. \" Current as of: May 30, 2016 Content Version: 11.2 © 5244-3555 [employer]. Care instructions adapted under license by [employer] (which disclaims liability or warranty for this information). If you have questions about a medical condition or this instruction, always ask your healthcare professional. [address] any warranty or liability for your use of this information. Discharge Orders None Introducing [facility]! [employer] introduces Qapital patient portal. Now you can access parts of your medical record, email your doctor's office, and request medication refills online. 1. In your internet browser, go to [email or web address] 2. Click on the First Time User? Click Here link in the Sign In box. You will see the New Member Sign Up page. 3. Enter your Qapital Access Code exactly as it appears below. You will not need to use this code after youve completed the sign-up process. If you do not sign up before the expiration date, you must request a new code. · Qapital Access Code: [email or web address] Expires: 6/7/2017  1:46 PM 
 
4. Enter the last four digits of your Social Security Number (xxxx) and Date of Birth (mm/dd/yyyy) as indicated and click Submit. You will be taken to the next sign-up page. 5. Create a Vtrimt ID. This will be your Qapital login ID and cannot be changed, so think of one that is secure and easy to remember. 6. Create a Qapital password. You can change your password at any time. 7. Enter your Password Reset Question and Answer. This can be used at a later time if you forget your password. 8. Enter your e-mail address.  You will receive e-mail notification when new information is available in [employer]. 9. Click Sign Up. You can now view and download portions of your medical record. 10. Click the Download Summary menu link to download a portable copy of your medical information. If you have questions, please visit the Frequently Asked Questions section of the [employer] website. Remember, [employer] is NOT to be used for urgent needs. For medical emergencies, dial 911. Now available from your iPhone and Android! General Information Please provide this summary of care documentation to your next provider. Patient Signature:  ____________________________________________________________ Date:  ____________________________________________________________  
  
[facility] Provider Signature:  ____________________________________________________________ Date:  ____________________________________________________________

## 2017-05-22 NOTE — PROGRESS NOTES
0100: Pt. Arrives with c/o contractions that have increased in intensity since being discharged earlier. Reports small amount of vaginal discharge and bleeding. 0110: Pt. Requesting pain medication. States contractions are hurting too much and she \"can't do it anymore\". 0114: Dr. Yesika Katz notified of patients arrival, UC pattern, SVE, and desire for pain medication. Orders received. 3085: Dr. Yesika Katz at bedside evaluating patient.   0710: SBAR report given to [facility] Braxton RN

## 2017-05-22 NOTE — PROGRESS NOTES
1552 Patient arrived to labor and delivery for admission for labor and epidural placement. 1615 Attempted IV placement. IV infiltrated. 700 [facility] ER Rosa Hudson RN attempted IV placement but unsuccessful. [place] Blas Bunker, RN attempted two IV placements with final attempt successful in [facility].     1740 IV blown after several attempts to bolus patient for epidural.     1750 Dr. Brandon Carrero and KEAGAN Byrnes CRNA at bedside to use ultrasound to guide IV placement. 1810 KEAGAN Byrnes CRNA able to insert IV using ultrasound at this time. 100 [facility] Dr. Brandon Carrero at bedside to place epidural.     8020 Notified Dr. Homero aGrcia of elevated pulse. No new orders given. 1900 Bedside and Verbal shift change report given to EMILY Hill RN (oncoming nurse) by Boris Batista RN (offgoing nurse). Report included the following information SBAR, Kardex, MAR, Accordion, Recent Results and Med Rec Status.

## 2017-05-22 NOTE — IP AVS SNAPSHOT
[address] [place] [address] 
[phone] Patient: Josie Mills MRN: [id number] :1985 You are allergic to the following No active allergies Recent Documentation Height Weight Breastfeeding? BMI OB Status Smoking Status 1.702 m 83.9 kg Unknown 28.98 kg/m2 Recent pregnancy Never Smoker Unresulted Labs Order Current Status SAMPLE TO BLOOD BANK In process Emergency Contacts Name Discharge Info Relation Home Work Mobile Radu Patrick DISCHARGE CAREGIVER [3] Spouse [3] [phone] About your hospitalization You were admitted on:  May 22, 2017 You last received care in the:  [facility] You were discharged on:  May 25, 2017 Unit phone number:  [phone] Why you were hospitalized Your primary diagnosis was:  Not on File Your diagnoses also included:  Pregnancy Providers Seen During Your Hospitalizations Provider Role Specialty Primary office phone Alisa Feliciano DO Attending Provider Obstetrics & Gynecology [phone] Sedrick Mahan MD Attending Provider Obstetrics & Gynecology [phone] Your Primary Care Physician (PCP) Primary Care Physician Office Phone Office Fax Shea Lexi [phone] Follow-up Information Follow up With Details Comments Contact Info Greta Grady MD   [address] [place] [address] [address] [address] [phone] Current Discharge Medication List  
  
START taking these medications Dose & Instructions Dispensing Information Comments Morning Noon Evening Bedtime  
 ibuprofen 800 mg tablet Commonly known as:  MOTRIN Your last dose was: Your next dose is:    
   
   
 Dose:  800 mg Take 1 Tab by mouth every eight (8) hours as needed for Pain. Quantity:  60 Tab Refills:  0 CONTINUE these medications which have NOT CHANGED Dose & Instructions Dispensing Information Comments Morning Noon Evening Bedtime IRON (FERROUS SULFATE) PO Your last dose was: Your next dose is:    
   
   
 Dose:  1 Each Take 1 Each by mouth daily. Refills:  0  
     
   
   
   
  
 PNV No12-Iron-FA-DSS-OM-3 29 mg iron-1 mg -50 mg Cpkd Your last dose was: Your next dose is: Take  by mouth. Refills:  0 Where to Get Your Medications Information on where to get these meds will be given to you by the nurse or doctor. ! Ask your nurse or doctor about these medications  
  ibuprofen 800 mg tablet Discharge Instructions Discharge Instructions for Vaginal Delivery Patient ID: 
Germán Bar [id number] 
36 y.o. 
1985 Take Home Medications Continue taking your prenatal vitamins if you are breastfeeding. Follow-up care is a key part of your treatment and safety. Please schedule and keep appointments. Follow-up with your primary OB in 6 weeks. Activity Avoid anything in your vagina for 6 weeks (no intercourse, tampons, or douching). You may drive unless you are taking prescription pain medications. Climbing stairs and light lifting are okay. Please avoid excessive exercise, though walking is okay- you'll be tired! Diet Regular diet as tolerated. Be sure to drink plenty of fluids if you are breastfeeding. Wound care If you have stitches, continue to rinse with a squirt bottle of warm water each time you void for about 7-10 days. .  Your stitches will gradually dissolve over four to eight weeks. Sitz baths are also helpful to keep the wound clean, encourage healing, and to help with pain associated with the stitches or hemorrhoids. You can use either a sitz bath basin or a bathtub filled with 2-3\" inches of plain warm water.   Soak for 10 minutes 3 times a day as tolerated. Pain Management 1. Over the counter medications such as Tylenol and ibuprofen (Motrin or Advil) are ideal.  These may be taken together, alternating doses. You may  take the maximum dose:  Motrin or Advil (generic ibuprofen), either 3 tablets every 6 hours or 4 tablets every 8 hours or Tylenol (acetominophen) 1000mg every 6 hours (equivalent to 2 extra strength Tylenol). 2. You may also have a precrescription for stronger pain medication. Take only as needed and transition to over the counter medication in the next few days. Minimize amounts of the prescription medication, as it can be habit-forming and will worsen or cause constipation. Most patients will find that within a couple of days, their pain is adequately controlled using only over-the-counter medications. 3. The prescription pain medication is mixed with Tylenol, therefore, you should not take any extra Tylenol or acetaminophen until you have reduced your prescription pain medication. 4. Add heating pad or sitz baths as needed. Add hemorrhoid wipes or ointments if needed Constipation 1. Constipation is normal after pregnancy and delivery, especially while taking prescription narcotic pain medication. 2. Over the counter remedies including ducosate (Colace), take 1-2 capsules 1-2 times daily for soft stool as needed. You may also add/ try milk of magnesia or rectal remedies such as Dulcolax or Fleets enema. Recovery: What to Expect at [facility] 1. Fatigue is expected. Try to rest when you can and don't worry about doing housework or other tasks which can wait. 2. The soreness along your bottom will improve significantly over the first 2 weeks, but it may take 6 weeks before you are completely recovered. 3. Back pain or general body aches or muscle soreness are expected and should improve with acetominophen or ibuprofen.  
4. Leg swelling due to pregnancy and/or IV fluids given in the hospital will take about two weeks to resolve. 5. Most women experience some form of the \"Baby Blues\" after having a baby. Feeling emotional, tearful, frustrated, anxious, sad, and irritable some of the time is normal and go away after about 2 weeks. Adequate rest and help from your family will help. Take breaks from caring for the baby. Call your doctor if your symptoms seem severe, last more than 2 weeks, or seem to be getting worse instead of better. Get help immediately if you have thoughts of wanting to hurt yourself or others! Call your doctor or seek immediate medical care if you have: 
Heavy vaginal bleeding, soaking through one or more pads an hour for several hours. Foul-smelling discharge from your vagina or incision. Consistent nausea and vomiting and cannot keep fluids down. Consistent pain that does not get better after you take pain medicine. Sudden chest pain and shortness of breath Signs of a blood clot: pain/ swelling/ increasing redness in your lower extremeties Signs of infection: increased pain in your abdomen or vaginal area; red streaks, warmth, or tenderness of your breasts; fever of 100.5 F or greater [employer]. comhart Activation Thank you for requesting access to Transcriptic. Please follow the instructions below to securely access and download your online medical record. Transcriptic allows you to send messages to your doctor, view your test results, renew your prescriptions, schedule appointments, and more. How Do I Sign Up? 1. In your internet browser, go to [email or web address] 
2. Click on the First Time User? Click Here link in the Sign In box. You will be redirect to the New Member Sign Up page. 3. Enter your Transcriptic Access Code exactly as it appears below. You will not need to use this code after youve completed the sign-up process. If you do not sign up before the expiration date, you must request a new code.  
 
Transcriptic Access Code: [email or web address] 
 Expires: 2017  1:46 PM (This is the date your eBoox access code will ) 4. Enter the last four digits of your Social Security Number (xxxx) and Date of Birth (mm/dd/yyyy) as indicated and click Submit. You will be taken to the next sign-up page. 5. Create a eBoox ID. This will be your eBoox login ID and cannot be changed, so think of one that is secure and easy to remember. 6. Create a eBoox password. You can change your password at any time. 7. Enter your Password Reset Question and Answer. This can be used at a later time if you forget your password. 8. Enter your e-mail address. You will receive e-mail notification when new information is available in [address] 9. Click Sign Up. You can now view and download portions of your medical record. 10. Click the Download Summary menu link to download a portable copy of your medical information. Additional Information If you have questions, please visit the Frequently Asked Questions section of the eBoox website at [email or web address] Remember, eBoox is NOT to be used for urgent needs. For medical emergencies, dial 911. Discharge Orders None eBoox Announcement We are excited to announce that we are making your provider's discharge notes available to you in eBoox. You will see these notes when they are completed and signed by the physician that discharged you from your recent hospital stay. If you have any questions or concerns about any information you see in eBoox, please call the Health Information Department where you were seen or reach out to your Primary Care Provider for more information about your plan of care. Introducing [facility]! Jose Manley introduces eBoox patient portal. Now you can access parts of your medical record, email your doctor's office, and request medication refills online.    
 
1. In your internet browser, go to [email or web address] 2. Click on the First Time User? Click Here link in the Sign In box. You will see the New Member Sign Up page. 3. Enter your [facility] Access Code exactly as it appears below. You will not need to use this code after youve completed the sign-up process. If you do not sign up before the expiration date, you must request a new code. · [facility] Access Code: [email or web address] Expires: 6/7/2017  1:46 PM 
 
4. Enter the last four digits of your Social Security Number (xxxx) and Date of Birth (mm/dd/yyyy) as indicated and click Submit. You will be taken to the next sign-up page. 5. Create a [facility] ID. This will be your [facility] login ID and cannot be changed, so think of one that is secure and easy to remember. 6. Create a [facility] password. You can change your password at any time. 7. Enter your Password Reset Question and Answer. This can be used at a later time if you forget your password. 8. Enter your e-mail address. You will receive e-mail notification when new information is available in [address] 9. Click Sign Up. You can now view and download portions of your medical record. 10. Click the Download Summary menu link to download a portable copy of your medical information. If you have questions, please visit the Frequently Asked Questions section of the [facility] website. Remember, [facility] is NOT to be used for urgent needs. For medical emergencies, dial 911. Now available from your iPhone and Android! General Information Please provide this summary of care documentation to your next provider. Patient Signature:  ____________________________________________________________ Date:  ____________________________________________________________  
  
Ana Iba Provider Signature:  ____________________________________________________________ Date:  ____________________________________________________________

## 2017-05-22 NOTE — DISCHARGE INSTRUCTIONS
Week 40 of Your Pregnancy: Care Instructions  Your Care Instructions    By week 40, you have reached your due date. Your baby could be coming any day. But it's a good idea to think ahead to the next few weeks and what might happen. If this is your first time having a baby, try not to worry. If you don't start labor on your own by 41 or 42 weeks, your doctor may recommend giving you medicines to start labor. This care sheet gives you information about how labor can be started. It also gives you some ideas about breathing exercises you can do if you start to feel anxious or if you are trying to relax. Follow-up care is a key part of your treatment and safety. Be sure to make and go to all appointments, and call your doctor if you are having problems. It's also a good idea to know your test results and keep a list of the medicines you take. How can you care for yourself at home? Learn how labor can be started  · If you and your baby are both healthy and ready, and if your cervix has started to open, your doctor may \"break your water\" (rupture the amniotic sac). This often starts labor. · If your cervix is not quite ready, you may get a medicine called Pitocin through an IV to start contractions. · If your cervix is still very firm, you may have prostaglandin tablets (misoprostol) placed in your vagina to soften the cervix. Try guided imagery to help you relax  · Find a comfortable place to sit or lie down. Close your eyes. · Start by just taking a few deep breaths to help you relax. · Picture a setting that is calm and peaceful. This could be a beach, a mountain setting, a meadow, or a scene that you choose. · Imagine your scene, and try to add some detail. For example, is there a breeze? What does the bryan look like? Is it clear, or are there clouds? · It often helps to add a path to your scene.  For example, as you enter the meadow, imagine a path leading you through the meadow to the trees on the other side. As you follow the path farther into the [facility] you feel more and more relaxed. · When you are deep into your scene and are feeling relaxed, take a few minutes to breathe slowly and feel the calm. · When you are ready, slowly take yourself out of the scene back to the present. Tell yourself that you will feel relaxed and refreshed and will bring that sense of calm with you. · Count to 3, and open your eyes. Where can you learn more? Go to http://jasson-nola.info/. Enter C337 in the search box to learn more about \"Week 40 of Your Pregnancy: Care Instructions. \"  Current as of: May 30, 2016  Content Version: 11.2  © 9845-3335 [employer]. Care instructions adapted under license by [employer] (which disclaims liability or warranty for this information). If you have questions about a medical condition or this instruction, always ask your healthcare professional. [place] [address] any warranty or liability for your use of this information. Pregnancy Precautions: Care Instructions  Your Care Instructions  There is no sure way to prevent labor before your due date ( labor) or to prevent most other pregnancy problems. But there are things you can do to increase your chances of a healthy pregnancy. Go to your appointments, follow your doctor's advice, and take good care of yourself. Eat well, and exercise (if your doctor agrees). And make sure to drink plenty of water. Follow-up care is a key part of your treatment and safety. Be sure to make and go to all appointments, and call your doctor if you are having problems. It's also a good idea to know your test results and keep a list of the medicines you take. How can you care for yourself at home? · Make sure you go to your prenatal appointments. At each visit, your doctor will check your blood pressure. Your doctor will also check to see if you have protein in your urine.  High blood pressure and protein in urine are signs of preeclampsia. This condition can be dangerous for you and your baby. · Drink plenty of fluids, enough so that your urine is light yellow or clear like water. Dehydration can cause contractions. If you have kidney, heart, or liver disease and have to limit fluids, talk with your doctor before you increase the amount of fluids you drink. · Tell your doctor right away if you notice any symptoms of an infection, such as:  ¨ Burning when you urinate. ¨ A foul-smelling discharge from your vagina. ¨ Vaginal itching. ¨ Unexplained fever. ¨ Unusual pain or soreness in your uterus or lower belly. · Eat a balanced diet. Include plenty of foods that are high in calcium and iron. ¨ Foods high in calcium include milk, cheese, yogurt, almonds, and broccoli. ¨ Foods high in iron include red meat, shellfish, poultry, eggs, beans, raisins, whole-grain bread, and leafy green vegetables. · Do not smoke. If you need help quitting, talk to your doctor about stop-smoking programs and medicines. These can increase your chances of quitting for good. · Do not drink alcohol or use illegal drugs. · Follow your doctor's directions about activity. Your doctor will let you know how much, if any, exercise you can do. · Ask your doctor if you can have sex. If you are at risk for early labor, your doctor may ask you to not have sex. · Take care to prevent falls. During pregnancy, your joints are loose, and your balance is off. Sports such as bicycling, skiing, or in-line skating can increase your risk of falling. And don't ride horses or motorcycles, dive, water ski, scuba dive, or parachute jump while you are pregnant. · Avoid getting very hot. Do not use saunas or hot tubs. Avoid staying out in the sun in hot weather for long periods. Take acetaminophen (Tylenol) to lower a high fever.   · Do not take any over-the-counter or herbal medicines or supplements without talking to your doctor or pharmacist first.  When should you call for help? Call 911 anytime you think you may need emergency care. For example, call if:  · You passed out (lost consciousness). · You have severe vaginal bleeding. · You have severe pain in your belly or pelvis. · You have had fluid gushing or leaking from your vagina and you know or think the umbilical cord is bulging into your vagina. If this happens, immediately get down on your knees so your rear end (buttocks) is higher than your head. This will decrease the pressure on the cord until help arrives. Call your doctor now or seek immediate medical care if:  · You have signs of preeclampsia, such as:  ¨ Sudden swelling of your face, hands, or feet. ¨ New vision problems (such as dimness or blurring). ¨ A severe headache. · You have any vaginal bleeding. · You have belly pain or cramping. · You have a fever. · You have had regular contractions (with or without pain) for an hour. This means that you have 8 or more within 1 hour or 4 or more in 20 minutes after you change your position and drink fluids. · You have a sudden release of fluid from your vagina. · You have low back pain or pelvic pressure that does not go away. · You notice that your baby has stopped moving or is moving much less than normal.  Watch closely for changes in your health, and be sure to contact your doctor if you have any problems. Where can you learn more? Go to http://jasson-nola.info/. Enter [id number] in the search box to learn more about \"Pregnancy Precautions: Care Instructions. \"  Current as of: May 30, 2016  Content Version: 11.2  © 2637-1175 [employer]. Care instructions adapted under license by [employer] (which disclaims liability or warranty for this information).  If you have questions about a medical condition or this instruction, always ask your healthcare professional. [place] [address] any warranty or liability for your use of this information. Counting Your Baby's Kicks: Care Instructions  Your Care Instructions  Counting your baby's kicks is one way your doctor can tell that your baby is healthy. Most women--especially in a first pregnancy--feel their baby move for the first time between 16 and 22 weeks. The movement may feel like flutters rather than kicks. Your baby may move more at certain times of the day. When you are active, you may notice less kicking than when you are resting. At your prenatal visits, your doctor will ask whether the baby is active. In your last trimester, your doctor may ask you to count the number of times you feel your baby move. Follow-up care is a key part of your treatment and safety. Be sure to make and go to all appointments, and call your doctor if you are having problems. It's also a good idea to know your test results and keep a list of the medicines you take. How do you count fetal kicks? · A common method of checking your baby's movement is to count the number of kicks or moves you feel in 1 hour. Ten movements (such as kicks, flutters, or rolls) in 1 hour are normal. Some doctors suggest that you count in the morning until you get to 10 movements. Then you can quit for that day and start again the next day. · Pick your baby's most active time of day to count. This may be any time from morning to evening. · If you do not feel 10 movements in an hour, your baby may be sleeping. Wait for the next hour and count again. When should you call for help? Call your doctor now or seek immediate medical care if:  · You noticed that your baby has stopped moving or is moving much less than normal.  Watch closely for changes in your health, and be sure to contact your doctor if you have any problems. Where can you learn more? Go to http://jasson-nola.info/. Enter P869 in the search box to learn more about \"Counting Your Baby's Kicks: Care Instructions. \"  Current as of: May 30, 2016  Content Version: 11.2  © 9095-0941 [employer]. Care instructions adapted under license by [employer] (which disclaims liability or warranty for this information). If you have questions about a medical condition or this instruction, always ask your healthcare professional. [address] any warranty or liability for your use of this information.

## 2017-05-22 NOTE — IP AVS SNAPSHOT
Current Discharge Medication List  
  
START taking these medications Dose & Instructions Dispensing Information Comments Morning Noon Evening Bedtime  
 ibuprofen 800 mg tablet Commonly known as:  MOTRIN Your last dose was: Your next dose is:    
   
   
 Dose:  800 mg Take 1 Tab by mouth every eight (8) hours as needed for Pain. Quantity:  60 Tab Refills:  0 CONTINUE these medications which have NOT CHANGED Dose & Instructions Dispensing Information Comments Morning Noon Evening Bedtime IRON (FERROUS SULFATE) PO Your last dose was: Your next dose is:    
   
   
 Dose:  1 Each Take 1 Each by mouth daily. Refills:  0  
     
   
   
   
  
 PNV No12-Iron-FA-DSS-OM-3 29 mg iron-1 mg -50 mg Cpkd Your last dose was: Your next dose is: Take  by mouth. Refills:  0 Where to Get Your Medications Information on where to get these meds will be given to you by the nurse or doctor. ! Ask your nurse or doctor about these medications  
  ibuprofen 800 mg tablet

## 2017-05-22 NOTE — H&P
History & Physical    Name: Hesham Randolph MRN: [id number]  SSN: [id number]    YOB: 1985  Age: 32 y.o. Sex: female        Subjective:     Estimated Date of Delivery: 17  OB History      Para Term  AB TAB SAB Ectopic Multiple Living    2 0   1  1             Ms. Chari Wilde is admitted with pregnancy at 39w3d for contractions. Prenatal course was normal. Please see prenatal records for details. Past Medical History:   Diagnosis Date    Infertility, female     IVF with [facility] to conceive     Past Surgical History:   Procedure Laterality Date    BREAST SURGERY PROCEDURE UNLISTED      breast implants     HX TONSILLECTOMY Bilateral      Social History     Occupational History    Not on file. Social History Main Topics    Smoking status: Never Smoker    Smokeless tobacco: Never Used    Alcohol use No    Drug use: No    Sexual activity: Yes     Partners: Male     Birth control/ protection: None     No family history on file. No Known Allergies  Prior to Admission medications    Medication Sig Start Date End Date Taking? Authorizing Provider   IRON, FERROUS SULFATE, PO Take 1 Each by mouth daily. Yes Historical Provider   PNV No12-Iron-FA-DSS-OM-3 29 mg iron-1 mg -50 mg CPKD Take  by mouth. Historical Provider        Review of Systems: A comprehensive review of systems was negative except for that written in the HPI. Objective:     Vitals:  Vitals:    17 0540 17 0543 17 0545 17 0550   BP:   115/67    Pulse:   86    Resp:       Temp:       SpO2: 95% 93% 95% 97%   Weight:       Height:            Physical Exam:  Patient without distress.   Abdomen: soft, nontender  Fundus: soft and non tender  Perineum: blood absent, amniotic fluid absent  Cervical Exam: 3 cm dilated    90% effaced    -2 station    Presenting Part: cephalic  Cervical Position: mid position  Consistency: Soft  Lower Extremities:  - Edema 1+  Membranes:  Intact  Fetal Heart Rate: Reactive    Prenatal Labs:   Lab Results   Component Value Date/Time    Rubella, External Immune 10/17/2016    GrBStrep, External Negative 05/01/2017    HBsAg, External Negative 10/17/2016    HIV, External Negative 10/17/2016    RPR, External Non-Reactive 10/17/2016    Gonorrhea, External Negative 10/17/2016    Chlamydia, External Negative 10/17/2016        Assessment/Plan:     Plan: Admit for Reassuring fetal status, Continue plan for vaginal delivery. Group B Strep was negative. Has received 2 doses of stadol and is resting. Contraction spaced out.     Signed By:  Sona Chaves MD     May 22, 2017

## 2017-05-22 NOTE — ANESTHESIA PROCEDURE NOTES
Epidural Block    Start time: 5/22/2017 6:03 PM  End time: 5/22/2017 6:28 PM  Performed by: Sandy Hamman by: Yeison Joy     Pre-Procedure  Indication: labor epidural    Preanesthetic Checklist: patient identified, risks and benefits discussed, anesthesia consent, patient being monitored, timeout performed and anesthesia consent    Timeout Time: 18:03        Epidural:   Patient position:  Seated  Prep region:  Lumbar  Prep: Betadine    Location:  L3-4    Needle and Epidural Catheter:   Needle Type:  Tuohy  Needle Gauge:  17 G  Injection Technique:  Loss of resistance using air  Attempts:  2  Catheter Size:  20 G  Catheter at Skin Depth (cm):  11  Depth in Epidural Space (cm):  5  Events: no blood with aspiration, no cerebrospinal fluid with aspiration and negative aspiration test    Test Dose:  Lidocaine 1.5% w/ epi and negative    Assessment:   Catheter Secured:  Tape  Insertion:  Uncomplicated  Patient tolerance:  Patient tolerated the procedure well with no immediate complications

## 2017-05-22 NOTE — IP AVS SNAPSHOT
Current Discharge Medication List  
  
ASK your doctor about these medications Dose & Instructions Dispensing Information Comments Morning Noon Evening Bedtime IRON (FERROUS SULFATE) PO Your last dose was: Your next dose is:    
   
   
 Dose:  1 Each Take 1 Each by mouth daily. Refills:  0  
     
   
   
   
  
 PNV No12-Iron-FA-DSS-OM-3 29 mg iron-1 mg -50 mg Cpkd Your last dose was: Your next dose is: Take  by mouth. Refills:  0

## 2017-05-22 NOTE — PROGRESS NOTES
1900 Bedside and Verbal shift change report given to Jaylyn Joya RN (oncoming nurse) by Pauline Campbell RN (offgoing nurse). Report included the following information SBAR, Kardex, Procedure Summary, MAR and Recent Results. 1905 Dr. Lisa Alvarado at bedside. 1908 Ephedrine given for BP 78/45 and HR in the 150s.     1912 SVE by Dr. Lisa Alvarado 6/90/-2. AROM at this time by MD, thin mec noted. 2155 SVE by Dr. Lisa Alvarado 7/100/-1. VORB to start pitocin at this time. Patient feels warm to touch with pulse in the 130s-140s. .  Last temperature 98.7. Constantin Jean Baptiste from MD to give 975 of tylenol PO at this time. 2216 Rectal temperature of 98.7.     2355 SVE by Dr. Lisa Alvarado 10/100/+1. VORB to labor down and start pushing at [address] 892 REPORT:    Verbal report given to Mono Grant RN(name) on [address]  being transferred to MIU(unit) for routine progression of care       Report consisted of patients Situation, Background, Assessment and   Recommendations(SBAR). Information from the following report(s) SBAR, Kardex, Procedure Summary, Intake/Output, MAR and Recent Results was reviewed with the receiving nurse. Lines:   Peripheral IV 05/22/17 Right (Active)   Site Assessment Clean, dry, & intact 5/22/2017  7:33 PM   Phlebitis Assessment 0 5/22/2017  7:33 PM   Infiltration Assessment 0 5/22/2017  7:33 PM   Dressing Status Clean, dry, & intact 5/22/2017  7:33 PM   Dressing Type Transparent;Tape 5/22/2017  7:33 PM   Hub Color/Line Status Blue; Infusing 5/22/2017  7:33 PM   Alcohol Cap Used Yes 5/22/2017  7:33 PM        Opportunity for questions and clarification was provided.       Patient transported with:   Registered Nurse

## 2017-05-22 NOTE — PROGRESS NOTES
0740: Patient up to walk at this time, will recheck cervix in 2 hours and notify Dr. Marcus Mcgill with results. 1030: Discharge instructions given to patient including kick counts and labor precautions. Opportunity for questions and concerns provided. No questions at this time. 1035: Patient discharged home with family.

## 2017-05-22 NOTE — H&P
History & Physical    Name: Patricia Pérez MRN: [id number]  SSN: [id number]    YOB: 1985  Age: 32 y.o. Sex: female        Subjective:     Estimated Date of Delivery: 17  OB History      Para Term  AB TAB SAB Ectopic Multiple Living    2 0   1  1             Ms. Esperanza Lopez is admitted with pregnancy at 39w3d for active labor. Prenatal course was complicated by IVF pregnancy. Please see prenatal records for details. Past Medical History:   Diagnosis Date    Infertility, female     IVF with [facility] to conceive     Past Surgical History:   Procedure Laterality Date    BREAST SURGERY PROCEDURE UNLISTED      breast implants     HX TONSILLECTOMY Bilateral      Social History     Occupational History    Not on file. Social History Main Topics    Smoking status: Never Smoker    Smokeless tobacco: Never Used    Alcohol use No    Drug use: No    Sexual activity: Yes     Partners: Male     Birth control/ protection: None     History reviewed. No pertinent family history. No Known Allergies  Prior to Admission medications    Medication Sig Start Date End Date Taking? Authorizing Provider   IRON, FERROUS SULFATE, PO Take 1 Each by mouth daily. Yes Historical Provider   PNV No12-Iron-FA-DSS-OM-3 29 mg iron-1 mg -50 mg CPKD Take  by mouth. Yes Historical Provider        Review of Systems: A comprehensive review of systems was negative. Objective:     Vitals:  Vitals:    17 1923 17 19217 19217 193   BP: 104/58 103/61 106/73    Pulse: (!) 130 (!) 120 (!) 135    Resp:       Temp:       SpO2:  100%  100%   Weight:       Height:            Physical Exam:  Patient without distress.   Abdomen: soft, nontender  Fundus: soft and non tender  Perineum: blood absent, amniotic fluid absent  Cervical Exam: 6 cm dilated    90% effaced    -2 station    Presenting Part: cephalic  Cervical Position: anterior  Consistency: Soft  Lower Extremities:  - Edema No  Membranes:  Artificial Rupture of Membranes; Amniotic Fluid: medium amount of thin meconium fluid  Fetal Heart Rate: Baseline: 150 per minute  Variability: moderate  Accelerations: yes  Decelerations: none  Uterine contractions: regular, every 3 minutes    Prenatal Labs:   Lab Results   Component Value Date/Time    Rubella, External Immune 10/17/2016    GrBStrep, External Negative 2017    HBsAg, External Negative 10/17/2016    HIV, External Negative 10/17/2016    RPR, External Non-Reactive 10/17/2016    Gonorrhea, External Negative 10/17/2016    Chlamydia, External Negative 10/17/2016        Assessment/Plan:     Plan: Admit for Reassuring fetal status, Labor  Progressing normally, Continue plan for vaginal delivery. Group B Strep was negative. Epidural in place. AROM now. Anticipate .     Signed By:  Ti Fournier MD     May 22, 2017

## 2017-05-22 NOTE — ANESTHESIA PREPROCEDURE EVALUATION
Anesthetic History   No history of anesthetic complications            Review of Systems / Medical History  Patient summary reviewed, nursing notes reviewed and pertinent labs reviewed    Pulmonary  Within defined limits                 Neuro/Psych   Within defined limits           Cardiovascular  Within defined limits                Exercise tolerance: >4 METS     GI/Hepatic/Renal  Within defined limits              Endo/Other  Within defined limits           Other Findings              Physical Exam    Airway  Mallampati: II    Neck ROM: normal range of motion   Mouth opening: Normal     Cardiovascular  Regular rate and rhythm,  S1 and S2 normal,  no murmur, click, rub, or gallop  Rhythm: regular  Rate: normal         Dental  No notable dental hx       Pulmonary  Breath sounds clear to auscultation               Abdominal  GI exam deferred       Other Findings            Anesthetic Plan    ASA: 2  Anesthesia type: general and epidural      Post-op pain plan if not by surgeon: indwelling epidural catheter    Induction: Intravenous  Anesthetic plan and risks discussed with: Patient      Late entry - preop done, questions answered, and consent obtained prior to procedure; note entered after procedure at 6:27 PM

## 2017-05-23 PROCEDURE — 76060000078 HC EPIDURAL ANESTHESIA: Performed by: ANESTHESIOLOGY

## 2017-05-23 PROCEDURE — 75410000002 HC LABOR FEE PER 1 HR: Performed by: OBSTETRICS & GYNECOLOGY

## 2017-05-23 PROCEDURE — 0KQM0ZZ REPAIR PERINEUM MUSCLE, OPEN APPROACH: ICD-10-PCS | Performed by: OBSTETRICS & GYNECOLOGY

## 2017-05-23 PROCEDURE — 77030011943

## 2017-05-23 PROCEDURE — 77030021125

## 2017-05-23 PROCEDURE — 74011250636 HC RX REV CODE- 250/636: Performed by: OBSTETRICS & GYNECOLOGY

## 2017-05-23 PROCEDURE — 75410000000 HC DELIVERY VAGINAL/SINGLE: Performed by: OBSTETRICS & GYNECOLOGY

## 2017-05-23 PROCEDURE — 65270000029 HC RM PRIVATE

## 2017-05-23 PROCEDURE — 75410000003 HC RECOV DEL/VAG/CSECN EA 0.5 HR: Performed by: OBSTETRICS & GYNECOLOGY

## 2017-05-23 PROCEDURE — 74011250637 HC RX REV CODE- 250/637: Performed by: OBSTETRICS & GYNECOLOGY

## 2017-05-23 PROCEDURE — 77030018749 HC HK AMNIO DISP DERY -A

## 2017-05-23 RX ORDER — HYDROCORTISONE ACETATE PRAMOXINE HCL 2.5; 1 G/100G; G/100G
CREAM TOPICAL AS NEEDED
Status: DISCONTINUED | OUTPATIENT
Start: 2017-05-23 | End: 2017-05-25 | Stop reason: HOSPADM

## 2017-05-23 RX ORDER — ACETAMINOPHEN 325 MG/1
650 TABLET ORAL
Status: DISCONTINUED | OUTPATIENT
Start: 2017-05-23 | End: 2017-05-25 | Stop reason: HOSPADM

## 2017-05-23 RX ORDER — SWAB
1 SWAB, NON-MEDICATED MISCELLANEOUS DAILY
Status: DISCONTINUED | OUTPATIENT
Start: 2017-05-23 | End: 2017-05-25 | Stop reason: HOSPADM

## 2017-05-23 RX ORDER — NALOXONE HYDROCHLORIDE 0.4 MG/ML
0.4 INJECTION, SOLUTION INTRAMUSCULAR; INTRAVENOUS; SUBCUTANEOUS AS NEEDED
Status: DISCONTINUED | OUTPATIENT
Start: 2017-05-23 | End: 2017-05-25 | Stop reason: HOSPADM

## 2017-05-23 RX ORDER — IBUPROFEN 800 MG/1
800 TABLET ORAL EVERY 8 HOURS
Status: DISCONTINUED | OUTPATIENT
Start: 2017-05-23 | End: 2017-05-25 | Stop reason: HOSPADM

## 2017-05-23 RX ORDER — DIPHENHYDRAMINE HCL 25 MG
25 CAPSULE ORAL
Status: DISCONTINUED | OUTPATIENT
Start: 2017-05-23 | End: 2017-05-25 | Stop reason: HOSPADM

## 2017-05-23 RX ORDER — HYDROCODONE BITARTRATE AND ACETAMINOPHEN 5; 325 MG/1; MG/1
1 TABLET ORAL
Status: DISCONTINUED | OUTPATIENT
Start: 2017-05-23 | End: 2017-05-25 | Stop reason: HOSPADM

## 2017-05-23 RX ORDER — DOCUSATE SODIUM 100 MG/1
100 CAPSULE, LIQUID FILLED ORAL
Status: DISCONTINUED | OUTPATIENT
Start: 2017-05-23 | End: 2017-05-25 | Stop reason: HOSPADM

## 2017-05-23 RX ORDER — ONDANSETRON 4 MG/1
4 TABLET, ORALLY DISINTEGRATING ORAL
Status: DISCONTINUED | OUTPATIENT
Start: 2017-05-23 | End: 2017-05-25 | Stop reason: HOSPADM

## 2017-05-23 RX ORDER — ZOLPIDEM TARTRATE 5 MG/1
5 TABLET ORAL
Status: DISCONTINUED | OUTPATIENT
Start: 2017-05-23 | End: 2017-05-25 | Stop reason: HOSPADM

## 2017-05-23 RX ORDER — PEPPERMINT OIL
SPIRIT ORAL
Status: DISCONTINUED
Start: 2017-05-23 | End: 2017-05-23

## 2017-05-23 RX ORDER — OXYTOCIN/RINGER'S LACTATE 20/1000 ML
125-500 PLASTIC BAG, INJECTION (ML) INTRAVENOUS ONCE
Status: COMPLETED | OUTPATIENT
Start: 2017-05-23 | End: 2017-05-23

## 2017-05-23 RX ORDER — SIMETHICONE 80 MG
80 TABLET,CHEWABLE ORAL
Status: DISCONTINUED | OUTPATIENT
Start: 2017-05-23 | End: 2017-05-25 | Stop reason: HOSPADM

## 2017-05-23 RX ORDER — IBUPROFEN 800 MG/1
800 TABLET ORAL
Status: DISCONTINUED | OUTPATIENT
Start: 2017-05-23 | End: 2017-05-23

## 2017-05-23 RX ORDER — METHYLERGONOVINE MALEATE 0.2 MG/ML
0.2 INJECTION INTRAVENOUS
Status: COMPLETED | OUTPATIENT
Start: 2017-05-23 | End: 2017-05-23

## 2017-05-23 RX ADMIN — Medication 1 TABLET: at 08:59

## 2017-05-23 RX ADMIN — IBUPROFEN 800 MG: 800 TABLET, FILM COATED ORAL at 10:06

## 2017-05-23 RX ADMIN — IBUPROFEN 800 MG: 800 TABLET, FILM COATED ORAL at 02:03

## 2017-05-23 RX ADMIN — IBUPROFEN 800 MG: 800 TABLET, FILM COATED ORAL at 02:00

## 2017-05-23 RX ADMIN — METHYLERGONOVINE MALEATE 0.2 MG: 0.2 INJECTION, SOLUTION INTRAMUSCULAR; INTRAVENOUS at 03:00

## 2017-05-23 RX ADMIN — Medication 10000 MILLI-UNITS/HR: at 02:38

## 2017-05-23 RX ADMIN — IBUPROFEN 800 MG: 800 TABLET, FILM COATED ORAL at 18:29

## 2017-05-23 NOTE — PROGRESS NOTES
Labor Progress Note  Patient seen, fetal heart rate and contraction pattern evaluated, patient examined. Patient feels some pressure. Patient Vitals for the past 2 hrs:   BP Temp Pulse Resp SpO2   05/22/17 2145 - - - - 99 %   05/22/17 2143 114/52 - (!) 135 - -   05/22/17 2140 - - - - 99 %   05/22/17 2135 - 98.7 °F (37.1 °C) - - 99 %   05/22/17 2130 111/57 - (!) 138 - 98 %   05/22/17 2125 - - - - 98 %   05/22/17 2120 - - - - 98 %   05/22/17 2115 - - - - 98 %   05/22/17 2113 92/53 - (!) 141 - -   05/22/17 2110 - - - - 98 %   05/22/17 2105 - - - - 99 %   05/22/17 2100 - - - - 99 %   05/22/17 2058 99/58 - (!) 144 - -   05/22/17 2055 - - - - 99 %   05/22/17 2050 - - - - 99 %   05/22/17 2045 - - - - 99 %   05/22/17 2043 107/59 98.2 °F (36.8 °C) (!) 141 18 -   05/22/17 2026 - - - - 100 %   05/22/17 2021 - - - - 100 %   05/22/17 2016 - - - - 100 %   05/22/17 2013 110/66 - (!) 126 - -   05/22/17 2011 - - - - 100 %   05/22/17 2006 - - - - 100 %       Physical Exam:  Cervical Exam:  7 cm dilated  100% effaced  -1 station    Uterine Activity: Frequency: Every 2-4 minutes  Fetal Heart Rate: Baseline: 150 per minute  Variability: moderate  Accelerations: yes  Decelerations: none    Assessment/Plan:  Continue plan for vaginal delivery   Will augment with Pitocin  Maternal tachycardia: Unchanged after Ephedrine administration. Afebrile patient. Will give Tylenol 975mg empirically. If decreases pulse noted, then must consider the possibility of chorioamnionitis.     Honorio Griffith MD

## 2017-05-23 NOTE — L&D DELIVERY NOTE
Delivery Summary    Patient: Gabriella Horn MRN: [id number]  SSN: [id number]    YOB: 1985  Age: 32 y.o. Sex: female        Labor Events:    Labor: No    Rupture Date: 2017    Rupture Time: 7:12 PM    Rupture Type AROM    Amniotic Fluid Volume: Moderate     Amniotic Fluid Description: Thin Meconium     Induction: None         Augmentation: AROM    Cervical Ripening:       None      Delivery Events:  Episiotomy: None    Laceration(s): Second degree perineal;Right labial;Left labial;Other (comment)  left labia minora with a 2cm hole    Repaired: Yes     Number of Repair Packets: 3    Suture Type and Size: Other   monocryl 2-0 and monocryl 3-0 x2    Estimated Blood Loss (ml): 400        [facility] [[id number]]     Delivery Summary - Baby    Delivery Date: 2017   Delivery Time: 1:23 AM   Delivery Type:    Sex:  male  Gestational Age: 39w4d  Delivery Clinician:  Josep Blancas?: Yes   Delivery Location: L&D  210           APGARS  One minute Five minutes Ten minutes   Skin Color: 1    1       Heart Rate: 2   2         Reflex Irritability: 2   2         Muscle Tone: 2   2       Respiration: 2   2         Total: 9   9           Presentation: Compound  Position: Left Occiput Anterior  Resuscitation Method:  Suctioning-bulb; Tactile Stimulation     Meconium Stained: Thin    Cord Information: 3 Vessels   Complications: None  Cord Blood Sent?:  Yes    Blood Gases Sent?:  No    Placenta:  Date/Time:   1:27 AM  Removal: Spontaneous      Appearance: Normal     [place] Measurements:  Birth Weight:      Birth Length:     Head Circumference:       Chest Circumference:      Abdominal Girth:       Other Providers:   GAYATHRI GOODMAN;KEILA TUCKER;LIBBY KHAN;PAUL JARRETT;LUIS ENRIQUE JARRETT;YAZAN SINGH Obstetrician;Primary Nurse;Primary  Nurse;Charge Nurse;Tech;Staff Nurse

## 2017-05-23 NOTE — ROUTINE PROCESS
SBAR IN Report: Mother    Verbal report received from Patricia Hull RN (full name & credentials) on this patient, who is now being transferred from L&D (unit) for routine progression of care. The patient is not wearing a green \"Anesthesia-Duramorph\" band. Report consisted of patient's Situation, Background, Assessment and Recommendations (SBAR). [place] ID bands were compared with the identification form, and verified with the patient and transferring nurse. Information from the SBAR, Kardex, Intake/Output, MAR and Recent Results and the [place] Report was reviewed with the transferring nurse; opportunity for questions and clarification provided.

## 2017-05-23 NOTE — DISCHARGE SUMMARY
Patient ID:  Lizeth Brock  [id number]  22 y.o.  1985    Admit Date: 2017    Discharge Date: 2017     Admitting Physician: Kim Zuniga MD  Attending Physician: Barry Angeles DO    Admission Diagnoses:   1. IUP at 39.4 weeks in active labor    Procedures for this admission:       Hospital Course: Maternal tachycardia without fever. Resolved after delivery    Discharge Diagnoses: Same as above with  producing a viable infant. Information for the patient's :  Carina Patrick [[id number]]   One Minute Apgar: 5 (Filed from Delivery Summary)  Five  Minute Apgar: 9 (Filed from Delivery Summary)        Discharge Disposition:  Home    Discharge Condition:  Good    Additional Diagnoses: None. Maternal Labs:   Lab Results   Component Value Date/Time    HBsAg, External Negative 10/17/2016    HIV, External Negative 10/17/2016    Rubella, External Immune 10/17/2016    RPR, External Non-Reactive 10/17/2016    GrBStrep, External Negative 2017       Cord Blood Results:   Information for the patient's :  Carina Patrick [[id number]]   No results found for: PCTABR, ABORH, PCTDIG, BILI, ABORH, ABORHEXT          History of Present Illness:   OB History      Para Term  AB TAB SAB Ectopic Multiple Living    2 1 1  1  1  0 1        Admitted for active labor. Hospital Course:   Patient was admitted as above and delivered via . Please the chart for details. The postpartum course was unremarkable. She was deemed stable for discharge home on day 2.     Follow up with Dr. Patel Connelly DO in 6 weeks        Signed:  Kim Zuniga MD  2017  2:03 AM

## 2017-05-23 NOTE — PROGRESS NOTES
Labor Progress Note  Patient seen, fetal heart rate and contraction pattern evaluated, patient examined. Feels vaginal pressure with CTXs. Patient Vitals for the past 2 hrs:   BP Temp Pulse Resp SpO2   05/22/17 2350 - - - - 96 %   05/22/17 2346 111/55 - (!) 122 - -   05/22/17 2345 - - - - 98 %   05/22/17 2344 - 98 °F (36.7 °C) - - -   05/22/17 2340 - - - - 99 %   05/22/17 2335 - - - - 99 %   05/22/17 2331 98/51 - (!) 126 - -   05/22/17 2330 - - - - 97 %   05/22/17 2325 - - - - 99 %   05/22/17 2320 - - - - 99 %   05/22/17 2316 114/59 - (!) 124 - -   05/22/17 2315 - - - - 98 %   05/22/17 2310 - - - - 98 %   05/22/17 2307 - 97.9 °F (36.6 °C) - 16 -   05/22/17 2305 - - - - 99 %   05/22/17 2258 117/55 - (!) 128 - -   05/22/17 2257 - - - - 99 %   05/22/17 2252 - - - - 99 %   05/22/17 2247 - - - - 98 %   05/22/17 2243 107/60 - (!) 120 - -   05/22/17 2242 - - - - 98 %   05/22/17 2237 - - - - 99 %   05/22/17 2232 - - - - 98 %   05/22/17 2229 102/51 - (!) 128 - -   05/22/17 2227 - - - - 99 %   05/22/17 2222 - - - - 99 %   05/22/17 2217 - - - - 97 %   05/22/17 2216 - 98.7 °F (37.1 °C) - - -   05/22/17 2214 97/54 - (!) 128 - -   05/22/17 2212 - - - - 97 %   05/22/17 2207 - - - - 97 %   05/22/17 2202 - - - - 97 %       Physical Exam:  Cervical Exam:  10 cm dilated  100% effaced  +1 station    Uterine Activity: Frequency: Every 2 minutes; Pit 4  Fetal Heart Rate: Baseline: 145 per minute  Variability: moderate  Accelerations: yes  Decelerations: none    Assessment/Plan:  Reassuring fetal status, Continue plan for vaginal delivery. Will push for delivery with persistent pressure.      Fadumo Fried MD

## 2017-05-23 NOTE — LACTATION NOTE
Mother was trying to breastfeed baby when [facility] came to visit. Baby was able to latch on well to right breast with a wide open mouth and his lips flanged out - mother able to feel good pulls and tugs during feeds. Audible swallows heard from baby. Baby had good rhythmic suck. Mother has LC# if needed.

## 2017-05-23 NOTE — ROUTINE PROCESS
Bedside and Verbal shift change report given to THIERNO Zavaleta (oncoming nurse) by Liborio Hernandez RN (offgoing nurse). Report included the following information SBAR, Kardex, Intake/Output, MAR and Recent Results.

## 2017-05-23 NOTE — LACTATION NOTE
Atul Hopkins Lactation Consultant Signed  Progress Notes Date of Service: 05/23/17 4287         Problem: Lactation Care Plan  Goal: *Infant latching appropriately  Outcome: Progressing Towards Goal  Pt will successfully establish breastfeeding by feeding in response to infant's early feeding cues and/or to offer breast every 2-3 hours. Ways to obtain a deep latch and seek comfortable positioning shared, aware to keep log of feedings/output. Goal: *Weight loss less than 10% of birth weight  Outcome: Progressing Towards Goal  Encouraged mom to attempt feeding every 2-3 hours in the first couple of days. Looking for 8-12 feedings in 24 hours. Don't limit baby at breast, allow baby to come of breast on it's own. Baby may want to feed more often then every 2-3 hours. Baby may not feed that often, but feedings should be attempted. If baby doesn't nurse, Mom can hand express drops of colostrum and drip into baby's mouth, or give to baby by finger feeding, cup feeding,or spoon feeding. Encouraged skin to skin.         Problem: Patient Education: Go to Patient Education Activity  Goal: Patient/Family Education  Outcome: Progressing Towards Goal  Discussed with mother her plan for feeding. Reviewed the benefits of exclusive breast milk feeding during the hospital stay. Informed her of the risks of using formula to supplement in the first few days of life as well as the benefits of successful breast milk feeding; referred her to the Breastfeeding booklet about this information. She acknowledges understanding of information reviewed and states that it is her plan to breastfeed her infant. Will support her choice and offer additional information as needed.       Hand Expression Education: Mom taught how to manually hand express her colostrum. Emphasized the importance of providing infant with valuable colostrum as infant rests skin to skin at breast. Aware to avoid extended periods of non-feeding.  Aware to offer 10-20+ drops of colostrum every 2-3 hours until infant is latching and nursing effectively. Taught the rationale behind this low tech but highly effective evidence based practice.     Comments:   Pt will successfully establish breastfeeding by feeding in response to early feeding cues   or wake every 3h, will obtain deep latch, and will keep log of feedings/output. Taught to BF at hunger cues and or q 2-3 hrs and to offer 10-20 drops of hand expressed colostrum at any non-feeds.       Breast Assessment  Left Breast: Medium  Left Nipple: Everted, Intact  Right Breast: Medium  Right Nipple: Everted, Intact  Breast- Feeding Assessment  Attends Breast-Feeding Classes: Yes  Breast-Feeding Experience: No  Breast Trauma/Surgery: Yes (Breast implants in 2007. Incision under both areolas.)  Type/Quality: Good (Mother states baby latched on and breast fed well last night. Baby last fed at 0830 for 30 min. )  Lactation Consultant Visits  Breast-Feedings: Attempted breast-feeding (Baby sleepy - mother able to easily hand express 10 gtts colostrum into baby's mouth. Mother given info. on Breastfeeding after breast and nipple surgery. Reviewed how to know baby is getting enough breast milk. )  Mother/Infant Observation  Mother Observation: Alignment, Breast comfortable, Close hold, Holds breast, Recognizes feeding cues  Infant Observation: Frenulum checked, Opens mouth  Mother attempted to breastfeed - baby sleepy - mother expressed 8 gtts. Colostrum into baby's mouth and will try and breastfeed again in 30-40 min.

## 2017-05-24 PROCEDURE — 74011250637 HC RX REV CODE- 250/637: Performed by: OBSTETRICS & GYNECOLOGY

## 2017-05-24 PROCEDURE — 65270000029 HC RM PRIVATE

## 2017-05-24 RX ADMIN — IBUPROFEN 800 MG: 800 TABLET, FILM COATED ORAL at 02:00

## 2017-05-24 RX ADMIN — Medication 1 TABLET: at 09:29

## 2017-05-24 RX ADMIN — IBUPROFEN 800 MG: 800 TABLET, FILM COATED ORAL at 18:29

## 2017-05-24 RX ADMIN — IBUPROFEN 800 MG: 800 TABLET, FILM COATED ORAL at 10:07

## 2017-05-24 NOTE — PROGRESS NOTES
Post-Partum Day Number 1 Progress Note    Paige Patrick       Information for the patient's :  Celina, Male [[id number]]   Vaginal, Spontaneous Delivery     Patient doing well without significant complaint. Voiding without difficulty, normal lochia. Vitals:  Visit Vitals    /66 (BP 1 Location: Left arm, BP Patient Position: At rest)    Pulse (!) 103    Temp 97.8 °F (36.6 °C)    Resp 16    Ht 5' 7\" (1.702 m)    Wt 83.9 kg (185 lb)    SpO2 98%    Breastfeeding Unknown    BMI 28.98 kg/m2     Temp (24hrs), Av.1 °F (36.7 °C), Min:97.8 °F (36.6 °C), Max:98.4 °F (36.9 °C)        Exam:   Patient without distress. FF @ U-2 NT                LE NT w/o edema    Labs:     Lab Results   Component Value Date/Time    WBC 14.8 2017 04:46 PM    WBC 12.4 2017 01:31 AM    HGB 13.3 2017 04:46 PM    HGB 13.1 2017 01:31 AM    HCT 38.2 2017 04:46 PM    HCT 38.5 2017 01:31 AM    PLATELET 823  04:46 PM    PLATELET 021  01:31 AM    Hgb, External 13.8 10/17/2016    Hct, External 41.8 10/17/2016       No results found for this or any previous visit (from the past 24 hour(s)). Assessment: PPD 1 s/p , stable; O+/RI    Plan:  1. Continue routine postpartum care  2. Maternal tachycardia: Improving. Decreased from 120s to 100s. Afebrile and asymptomatic throughout.   3. Declined circ for baby

## 2017-05-24 NOTE — ROUTINE PROCESS
Bedside and Verbal shift change report given to SAMEERA Uribe RN (oncoming nurse) by Cayden Barnes (offgoing nurse). Report included the following information SBAR, Procedure Summary, Intake/Output, MAR, Accordion, Recent Results and Med Rec Status.

## 2017-05-24 NOTE — LACTATION NOTE
This note was copied from a baby's chart. Parents getting ready to nap. Mother asking questions related to pacifier use, feeding frequency, and lactogenesis. BF basics reviewed. Explained pacifier use is discouraged until 4 weeks of life,  from and [facility], to ensure that breastfeeding is well established. Explained that if baby shows feeding cues to feed baby, explained that comfort sucking encourages baby to seek mother for comfort and encourages lactogenesis. Reviewed breastfeeding basics:  How milk is made and normal  breastfeeding behaviors discussed. Supply and demand,  stomach size, early feeding cues, skin to skin bonding with comfortable positioning and baby led latch-on reviewed. How to identify signs of successful breastfeeding sessions reviewed; education on assymetrical latch, signs of effective latching vs shallow, in-effective latching, normal  feeding frequency and duration and expected infant output discussed. Normal course of breastfeeding discussed including the AAP's recommendation that children receive exclusive breast milk feedings for the first six months of life with breast milk feedings to continue through the first year of life and/or beyond as complimentary table foods are added. Breastfeeding Booklet and Warm line information provided with discussion. Discussed typical  weight loss and the importance of pediatrician appointment within 24-48 hours of discharge, at 2 weeks of life and normalcy of requesting pediatric weight checks as needed in between visits. Pt will successfully establish breastfeeding by feeding in response to early feeding cues   or wake every 3h, will obtain deep latch, and will keep log of feedings/output. Taught to BF at hunger cues and or q 2-3 hrs and to offer 10-20 drops of hand expressed colostrum at any non-feeds.       Breast Assessment  Left Breast: Medium  Left Nipple: Everted, Intact  Right Breast: Medium  Right Nipple: Everted, Intact  Breast- Feeding Assessment  Attends Breast-Feeding Classes: Yes  Breast-Feeding Experience: No  Breast Trauma/Surgery: Yes (Breast implants in 2007.  Incision under both areolas.)  Type/Quality: Good  Lactation Consultant Visits  Breast-Feedings: Good   Mother/Infant Observation  Mother Observation: Breast comfortable, Recognizes feeding cues, Lets baby end feeding  Infant Observation: Rhythmic suck, Relaxed after feeding, Opens mouth, Lips flanged, upper, Lips flanged, lower, Feeding cues

## 2017-05-24 NOTE — PROGRESS NOTES
Bedside and Verbal shift change report given to [address] (oncoming nurse) by Jesus Manuel PICKARD (offgoing nurse). Report included the following information SBAR, Kardex and MAR.

## 2017-05-25 VITALS
RESPIRATION RATE: 15 BRPM | WEIGHT: 185 LBS | OXYGEN SATURATION: 98 % | TEMPERATURE: 98 F | SYSTOLIC BLOOD PRESSURE: 103 MMHG | HEIGHT: 67 IN | DIASTOLIC BLOOD PRESSURE: 50 MMHG | HEART RATE: 93 BPM | BODY MASS INDEX: 29.03 KG/M2

## 2017-05-25 PROCEDURE — 74011250637 HC RX REV CODE- 250/637: Performed by: OBSTETRICS & GYNECOLOGY

## 2017-05-25 RX ORDER — IBUPROFEN 800 MG/1
800 TABLET ORAL
Qty: 60 TAB | Refills: 0 | Status: ON HOLD | OUTPATIENT
Start: 2017-05-25 | End: 2019-03-31

## 2017-05-25 RX ADMIN — IBUPROFEN 800 MG: 800 TABLET, FILM COATED ORAL at 02:24

## 2017-05-25 NOTE — LACTATION NOTE
Problem: Lactation Care Plan  Goal: *Infant latching appropriately  Outcome: Resolved/Met Date Met:  05/25/17  Mom states breast feeding going well. Not seen at breast today.       Goal: *Weight loss less than 10% of birth weight  Outcome: Resolved/Met Date Met:  05/25/17  Encouraged mom to Look for 8-12 feedings in 24 hours. Don't limit baby at breast, allow baby to come of breast on it's own. Baby may want to feed more often then every 2-3 hours. Baby may not feed that often, but feedings should be attempted. If baby doesn't nurse, Mom can hand express drops of colostrum and drip into baby's mouth, or give to baby by finger feeding, cup feeding,or spoon feeding.       Mom agrees with plan.      Problem: Patient Education: Go to Patient Education Activity  Goal: Patient/Family Education  Outcome: Resolved/Met Date Met:  05/25/17  Discussed eating a healthy diet. Instructed mother to eat a variety of foods in order to get a well balanced diet. She should consume an extra 300-500 calories per day (more than her non-pregnant requirement.) These extra calories will help provide energy needed for optimal breast milk production. Mother also encouraged to \"drink to thirst\" and it is recommended that she drink fluids such as water and fruit/vegetable juice. Nutritious snacks should be available so that she can eat throughout the day to help satisfy her hunger and maintain a good milk supply. Continue taking your prenatal vitamins as long as you breast feed.       Chart shows numerous feedings, void, stool WNL. Discussed Importance of monitoring outputs and feedings on first week of Breastfeeding. Discussed ways to tell if baby getting enough, ie Voids and stools, by day 7, baby should have at least 4-6 wet diapers a day, change in color of stool to a seedy yellow, and return to birth wt within 2 weeks with a steady increase after that. . Follow up with pediatrician visit for weight check in 1-2 days reviewed.  Discussed Breast feeding support groups and encouraged to call [facility] line number, N4416580 or [facility] at [phone] for any questions/problems that arise.       Engorgement Care Guidelines: Anticipatory guidance shared. Reviewed how milk is made and normal phases of milk production. Taught care of engorged breasts -and how to help. If mom should experience engorged breas frequent breastfeeding encouraged, cool packs and motrin as tolerated.  Marcial Vallejo      Call your doctor, midwife and/or lactation consultant if:  · Baby is having no wet or dirty diapers   · Baby has dark colored urine after day 3  (should be pale yellow to clear)   · Baby has dark colored stools after day 4  (should be mustard yellow, with no meconium)   · Baby has fewer wet/soiled diapers or nurses less   frequently than the goals listed here   · Mom has symptoms of mastitis   (sore breast with fever, chills, flu-like aching)

## 2017-05-25 NOTE — DISCHARGE INSTRUCTIONS
Discharge Instructions for Vaginal Delivery    Patient ID:  Tesfaye Barton  [id number]  32 y.o.  1985    Take Home Medications       Continue taking your prenatal vitamins if you are breastfeeding. Follow-up care is a key part of your treatment and safety. Please schedule and keep appointments. Follow-up with your primary OB in 6 weeks. Activity  Avoid anything in your vagina for 6 weeks (no intercourse, tampons, or douching). You may drive unless you are taking prescription pain medications. Climbing stairs and light lifting are okay. Please avoid excessive exercise, though walking is okay- you'll be tired! Diet  Regular diet as tolerated. Be sure to drink plenty of fluids if you are breastfeeding. Wound care  If you have stitches, continue to rinse with a squirt bottle of warm water each time you void for about 7-10 days. .  Your stitches will gradually dissolve over four to eight weeks. Sitz baths are also helpful to keep the wound clean, encourage healing, and to help with pain associated with the stitches or hemorrhoids. You can use either a sitz bath basin or a bathtub filled with 2-3\" inches of plain warm water. Soak for 10 minutes 3 times a day as tolerated. Pain Management  1. Over the counter medications such as Tylenol and ibuprofen (Motrin or Advil) are ideal.  These may be taken together, alternating doses. You may  take the maximum dose:  Motrin or Advil (generic ibuprofen), either 3 tablets every 6 hours or 4 tablets every 8 hours or Tylenol (acetominophen) 1000mg every 6 hours (equivalent to 2 extra strength Tylenol). 2. You may also have a precrescription for stronger pain medication. Take only as needed and transition to over the counter medication in the next few days. Minimize amounts of the prescription medication, as it can be habit-forming and will worsen or cause constipation.  Most patients will find that within a couple of days, their pain is adequately controlled using only over-the-counter medications. 3. The prescription pain medication is mixed with Tylenol, therefore, you should not take any extra Tylenol or acetaminophen until you have reduced your prescription pain medication. 4. Add heating pad or sitz baths as needed. Add hemorrhoid wipes or ointments if needed    Constipation  1. Constipation is normal after pregnancy and delivery, especially while taking prescription narcotic pain medication. 2. Over the counter remedies including ducosate (Colace), take 1-2 capsules 1-2 times daily for soft stool as needed. You may also add/ try milk of magnesia or rectal remedies such as Dulcolax or Fleets enema. Recovery: What to Expect at Home  1. Fatigue is expected. Try to rest when you can and don't worry about doing housework or other tasks which can wait. 2. The soreness along your bottom will improve significantly over the first 2 weeks, but it may take 6 weeks before you are completely recovered. 3. Back pain or general body aches or muscle soreness are expected and should improve with acetominophen or ibuprofen. 4. Leg swelling due to pregnancy and/or IV fluids given in the hospital will take about two weeks to resolve. 5. Most women experience some form of the \"Baby Blues\" after having a baby. Feeling emotional, tearful, frustrated, anxious, sad, and irritable some of the time is normal and go away after about 2 weeks. Adequate rest and help from your family will help. Take breaks from caring for the baby. Call your doctor if your symptoms seem severe, last more than 2 weeks, or seem to be getting worse instead of better. Get help immediately if you have thoughts of wanting to hurt yourself or others! Call your doctor or seek immediate medical care if you have:  Heavy vaginal bleeding, soaking through one or more pads an hour for several hours. Foul-smelling discharge from your vagina or incision.   Consistent nausea and vomiting and cannot keep fluids down. Consistent pain that does not get better after you take pain medicine. Sudden chest pain and shortness of breath  Signs of a blood clot: pain/ swelling/ increasing redness in your lower extremeties  Signs of infection: increased pain in your abdomen or vaginal area; red streaks, warmth, or tenderness of your breasts; fever of 100.5 F or greater    MyChart Activation    Thank you for requesting access to [facility]. Please follow the instructions below to securely access and download your online medical record. [facility] allows you to send messages to your doctor, view your test results, renew your prescriptions, schedule appointments, and more. How Do I Sign Up? 1. In your internet browser, go to [email or web address]  2. Click on the First Time User? Click Here link in the Sign In box. You will be redirect to the New Member Sign Up page. 3. Enter your [facility] Access Code exactly as it appears below. You will not need to use this code after youve completed the sign-up process. If you do not sign up before the expiration date, you must request a new code. [facility] Access Code: [email or web address]  Expires: 2017  1:46 PM (This is the date your [facility] access code will )    4. Enter the last four digits of your Social Security Number (xxxx) and Date of Birth (mm/dd/yyyy) as indicated and click Submit. You will be taken to the next sign-up page. 5. Create a [facility] ID. This will be your [facility] login ID and cannot be changed, so think of one that is secure and easy to remember. 6. Create a [facility] password. You can change your password at any time. 7. Enter your Password Reset Question and Answer. This can be used at a later time if you forget your password. 8. Enter your e-mail address. You will receive e-mail notification when new information is available in [address] 9. Click Sign Up. You can now view and download portions of your medical record.   10. Click the Download Summary menu link to download a portable copy of your medical information. Additional Information    If you have questions, please visit the Frequently Asked Questions section of the [employer] website at [email or web address] Remember, [facility] [employer] is NOT to be used for urgent needs. For medical emergencies, dial 911.

## 2017-05-25 NOTE — PROGRESS NOTES
Bedside shift change report given to Tianna Romeo RN (oncoming nurse) by Cara Stokes (offgoing nurse). Report included the following information SBAR and MAR.

## 2017-05-25 NOTE — PROGRESS NOTES
Patient discharged to home with infant and significant other. Infant in carseat. Patient in wheelchair. Prescriptions given x 1 (ibuprofen) . Discharge instructions reviewed with patient and significant other, signed by patient, and copies given. Per patient and significant other, no questions. Patient and infant bands verified, see infant note and/or footprint sheet on chart.

## 2017-05-25 NOTE — PROGRESS NOTES
Post-Partum Day Number 2 Progress Note    Paige Patrick       Information for the patient's :  Celina Male [[id number]]   Vaginal, Spontaneous Delivery   Patient doing well without significant complaint. Voiding without difficulty, normal lochia. Tolerating diet without nausea or vomiting. Pain controlled with oral medications. Vitals:  Visit Vitals    /50 (BP 1 Location: Right arm, BP Patient Position: At rest)    Pulse 93    Temp 98 °F (36.7 °C)    Resp 15    Ht 5' 7\" (1.702 m)    Wt 83.9 kg (185 lb)    SpO2 98%    Breastfeeding Unknown    BMI 28.98 kg/m2     Temp (24hrs), Av.2 °F (36.8 °C), Min:97.8 °F (36.6 °C), Max:98.7 °F (37.1 °C)        Exam:   Patient without distress. FF @ U-2 NT                LE NT w/o edema    Labs:     Lab Results   Component Value Date/Time    WBC 14.8 2017 04:46 PM    WBC 12.4 2017 01:31 AM    HGB 13.3 2017 04:46 PM    HGB 13.1 2017 01:31 AM    HCT 38.2 2017 04:46 PM    HCT 38.5 2017 01:31 AM    PLATELET 561  04:46 PM    PLATELET 258  01:31 AM    Hgb, External 13.8 10/17/2016    Hct, External 41.8 10/17/2016     Lab Results   Component Value Date/Time    Rubella, External Immune 10/17/2016    GrBStrep, External Negative 2017    HBsAg, External Negative 10/17/2016    HIV, External Negative 10/17/2016    RPR, External Non-Reactive 10/17/2016    Gonorrhea, External Negative 10/17/2016    Chlamydia, External Negative 10/17/2016         Information for the patient's :  Celina Male [[id number]]   One Minute Apgar: 5 (Filed from Delivery Summary)  Five  Minute Apgar: 9 (Filed from Delivery Summary)      Assessment:   PPD 2  s/p , Doing well and stable  Plan:  1. Continue routine postpartum care  2. Discharge home today.    3. Medical complications: none    Edie Gamez DO  2017  7:05 AM

## 2019-03-31 ENCOUNTER — ANESTHESIA (OUTPATIENT)
Dept: LABOR AND DELIVERY | Age: 34
DRG: 540 | End: 2019-03-31
Payer: COMMERCIAL

## 2019-03-31 ENCOUNTER — ANESTHESIA EVENT (OUTPATIENT)
Dept: LABOR AND DELIVERY | Age: 34
DRG: 540 | End: 2019-03-31
Payer: COMMERCIAL

## 2019-03-31 ENCOUNTER — HOSPITAL ENCOUNTER (INPATIENT)
Age: 34
LOS: 2 days | Discharge: HOME | DRG: 540 | End: 2019-04-02
Attending: OBSTETRICS & GYNECOLOGY | Admitting: OBSTETRICS & GYNECOLOGY
Payer: COMMERCIAL

## 2019-03-31 DIAGNOSIS — F51.04 PSYCHOPHYSIOLOGICAL INSOMNIA: ICD-10-CM

## 2019-03-31 DIAGNOSIS — R52 PAIN: Primary | ICD-10-CM

## 2019-03-31 PROBLEM — O60.02 PRETERM LABOR IN SECOND TRIMESTER: Status: ACTIVE | Noted: 2019-03-31

## 2019-03-31 LAB
ABO + RH BLD: NORMAL
BLOOD GROUP ANTIBODIES SERPL: NORMAL
ERYTHROCYTE [DISTWIDTH] IN BLOOD BY AUTOMATED COUNT: 13.2 % (ref 11.5–14.5)
HCT VFR BLD AUTO: 38.1 % (ref 35–47)
HGB BLD-MCNC: 13.1 G/DL (ref 11.5–16)
MCH RBC QN AUTO: 34.1 PG (ref 26–34)
MCHC RBC AUTO-ENTMCNC: 34.4 G/DL (ref 30–36.5)
MCV RBC AUTO: 99.2 FL (ref 80–99)
NRBC # BLD: 0 K/UL (ref 0–0.01)
NRBC BLD-RTO: 0 PER 100 WBC
PLATELET # BLD AUTO: 295 K/UL (ref 150–400)
PMV BLD AUTO: 9.7 FL (ref 8.9–12.9)
RBC # BLD AUTO: 3.84 M/UL (ref 3.8–5.2)
SPECIMEN EXP DATE BLD: NORMAL
WBC # BLD AUTO: 10.9 K/UL (ref 3.6–11)

## 2019-03-31 PROCEDURE — 76060000078 HC EPIDURAL ANESTHESIA: Performed by: OBSTETRICS & GYNECOLOGY

## 2019-03-31 PROCEDURE — 74011250636 HC RX REV CODE- 250/636: Performed by: ANESTHESIOLOGY

## 2019-03-31 PROCEDURE — 77030018846 HC SOL IRR STRL H20 ICUM -A

## 2019-03-31 PROCEDURE — 77030008706 HC TU ET UNCUF SIMS -A

## 2019-03-31 PROCEDURE — 74011000250 HC RX REV CODE- 250

## 2019-03-31 PROCEDURE — 74011250636 HC RX REV CODE- 250/636

## 2019-03-31 PROCEDURE — 85027 COMPLETE CBC AUTOMATED: CPT

## 2019-03-31 PROCEDURE — 77030008477 HC STYL SATN SLP COVD -A

## 2019-03-31 PROCEDURE — 99284 EMERGENCY DEPT VISIT MOD MDM: CPT

## 2019-03-31 PROCEDURE — 76010000391 HC C SECN FIRST 1 HR: Performed by: OBSTETRICS & GYNECOLOGY

## 2019-03-31 PROCEDURE — 77030018836 HC SOL IRR NACL ICUM -A

## 2019-03-31 PROCEDURE — 74011250636 HC RX REV CODE- 250/636: Performed by: OBSTETRICS & GYNECOLOGY

## 2019-03-31 PROCEDURE — 86900 BLOOD TYPING SEROLOGIC ABO: CPT

## 2019-03-31 PROCEDURE — 77030039425 HC BLD LARYNG TRULITE DISP TELE -A

## 2019-03-31 PROCEDURE — 76010000392 HC C SECN EA ADDL 0.5 HR: Performed by: OBSTETRICS & GYNECOLOGY

## 2019-03-31 PROCEDURE — 88305 TISSUE EXAM BY PATHOLOGIST: CPT

## 2019-03-31 PROCEDURE — 10907ZC DRAINAGE OF AMNIOTIC FLUID, THERAPEUTIC FROM PRODUCTS OF CONCEPTION, VIA NATURAL OR ARTIFICIAL OPENING: ICD-10-PCS | Performed by: OBSTETRICS & GYNECOLOGY

## 2019-03-31 PROCEDURE — 77030018809 HC RETRCTR ALXSO DISP AMR -B

## 2019-03-31 PROCEDURE — 36415 COLL VENOUS BLD VENIPUNCTURE: CPT

## 2019-03-31 PROCEDURE — 65410000002 HC RM PRIVATE OB

## 2019-03-31 PROCEDURE — 75410000003 HC RECOV DEL/VAG/CSECN EA 0.5 HR: Performed by: OBSTETRICS & GYNECOLOGY

## 2019-03-31 PROCEDURE — 74011250637 HC RX REV CODE- 250/637: Performed by: OBSTETRICS & GYNECOLOGY

## 2019-03-31 RX ORDER — MORPHINE SULFATE 10 MG/ML
6 INJECTION, SOLUTION INTRAMUSCULAR; INTRAVENOUS
Status: DISCONTINUED | OUTPATIENT
Start: 2019-03-31 | End: 2019-04-02 | Stop reason: HOSPADM

## 2019-03-31 RX ORDER — PHENYLEPHRINE 10 MG/250 ML(40 MCG/ML)IN 0.9 % SOD.CHLORIDE INTRAVENOUS
Status: DISPENSED
Start: 2019-03-31 | End: 2019-03-31

## 2019-03-31 RX ORDER — HYDROCORTISONE 1 %
CREAM (GRAM) TOPICAL AS NEEDED
Status: DISCONTINUED | OUTPATIENT
Start: 2019-03-31 | End: 2019-04-02 | Stop reason: HOSPADM

## 2019-03-31 RX ORDER — OXYTOCIN/RINGER'S LACTATE 20/1000 ML
PLASTIC BAG, INJECTION (ML) INTRAVENOUS
Status: DISCONTINUED | OUTPATIENT
Start: 2019-03-31 | End: 2019-03-31 | Stop reason: HOSPADM

## 2019-03-31 RX ORDER — MORPHINE SULFATE 0.5 MG/ML
INJECTION, SOLUTION EPIDURAL; INTRATHECAL; INTRAVENOUS AS NEEDED
Status: DISCONTINUED | OUTPATIENT
Start: 2019-03-31 | End: 2019-03-31 | Stop reason: HOSPADM

## 2019-03-31 RX ORDER — DIPHENHYDRAMINE HCL 25 MG
25 CAPSULE ORAL
Status: DISCONTINUED | OUTPATIENT
Start: 2019-03-31 | End: 2019-04-02 | Stop reason: HOSPADM

## 2019-03-31 RX ORDER — OXYTOCIN/RINGER'S LACTATE 20/1000 ML
125-1000 PLASTIC BAG, INJECTION (ML) INTRAVENOUS
Status: DISCONTINUED | OUTPATIENT
Start: 2019-03-31 | End: 2019-03-31 | Stop reason: HOSPADM

## 2019-03-31 RX ORDER — SODIUM CHLORIDE, SODIUM LACTATE, POTASSIUM CHLORIDE, CALCIUM CHLORIDE 600; 310; 30; 20 MG/100ML; MG/100ML; MG/100ML; MG/100ML
125 INJECTION, SOLUTION INTRAVENOUS CONTINUOUS
Status: DISCONTINUED | OUTPATIENT
Start: 2019-03-31 | End: 2019-04-01

## 2019-03-31 RX ORDER — ONDANSETRON 2 MG/ML
INJECTION INTRAMUSCULAR; INTRAVENOUS AS NEEDED
Status: DISCONTINUED | OUTPATIENT
Start: 2019-03-31 | End: 2019-03-31 | Stop reason: HOSPADM

## 2019-03-31 RX ORDER — ONDANSETRON 2 MG/ML
4 INJECTION INTRAMUSCULAR; INTRAVENOUS
Status: DISCONTINUED | OUTPATIENT
Start: 2019-03-31 | End: 2019-04-02 | Stop reason: HOSPADM

## 2019-03-31 RX ORDER — ZOLPIDEM TARTRATE 5 MG/1
5 TABLET ORAL
Status: DISCONTINUED | OUTPATIENT
Start: 2019-03-31 | End: 2019-04-02 | Stop reason: HOSPADM

## 2019-03-31 RX ORDER — EPHEDRINE SULFATE 50 MG/ML
INJECTION, SOLUTION INTRAVENOUS AS NEEDED
Status: DISCONTINUED | OUTPATIENT
Start: 2019-03-31 | End: 2019-03-31 | Stop reason: HOSPADM

## 2019-03-31 RX ORDER — NALBUPHINE HYDROCHLORIDE 10 MG/ML
5 INJECTION, SOLUTION INTRAMUSCULAR; INTRAVENOUS; SUBCUTANEOUS
Status: DISCONTINUED | OUTPATIENT
Start: 2019-03-31 | End: 2019-04-02 | Stop reason: HOSPADM

## 2019-03-31 RX ORDER — ACETAMINOPHEN 10 MG/ML
INJECTION, SOLUTION INTRAVENOUS AS NEEDED
Status: DISCONTINUED | OUTPATIENT
Start: 2019-03-31 | End: 2019-03-31 | Stop reason: HOSPADM

## 2019-03-31 RX ORDER — ACETAMINOPHEN 325 MG/1
650 TABLET ORAL
Status: DISCONTINUED | OUTPATIENT
Start: 2019-03-31 | End: 2019-04-02 | Stop reason: HOSPADM

## 2019-03-31 RX ORDER — IBUPROFEN 400 MG/1
800 TABLET ORAL EVERY 8 HOURS
Status: DISCONTINUED | OUTPATIENT
Start: 2019-03-31 | End: 2019-04-02 | Stop reason: HOSPADM

## 2019-03-31 RX ORDER — MORPHINE SULFATE 10 MG/ML
10 INJECTION, SOLUTION INTRAMUSCULAR; INTRAVENOUS
Status: DISCONTINUED | OUTPATIENT
Start: 2019-03-31 | End: 2019-04-02 | Stop reason: HOSPADM

## 2019-03-31 RX ORDER — SODIUM CHLORIDE 0.9 % (FLUSH) 0.9 %
5-40 SYRINGE (ML) INJECTION AS NEEDED
Status: DISCONTINUED | OUTPATIENT
Start: 2019-03-31 | End: 2019-04-02 | Stop reason: HOSPADM

## 2019-03-31 RX ORDER — BUPIVACAINE HYDROCHLORIDE 7.5 MG/ML
INJECTION, SOLUTION EPIDURAL; RETROBULBAR AS NEEDED
Status: DISCONTINUED | OUTPATIENT
Start: 2019-03-31 | End: 2019-03-31 | Stop reason: HOSPADM

## 2019-03-31 RX ORDER — SODIUM CHLORIDE 0.9 % (FLUSH) 0.9 %
5-40 SYRINGE (ML) INJECTION EVERY 8 HOURS
Status: DISCONTINUED | OUTPATIENT
Start: 2019-03-31 | End: 2019-04-01

## 2019-03-31 RX ORDER — KETOROLAC TROMETHAMINE 30 MG/ML
30 INJECTION, SOLUTION INTRAMUSCULAR; INTRAVENOUS
Status: DISPENSED | OUTPATIENT
Start: 2019-03-31 | End: 2019-04-01

## 2019-03-31 RX ORDER — AMMONIA 15 % (W/V)
1 AMPUL (EA) INHALATION AS NEEDED
Status: DISCONTINUED | OUTPATIENT
Start: 2019-03-31 | End: 2019-04-02 | Stop reason: HOSPADM

## 2019-03-31 RX ORDER — DOCUSATE SODIUM 100 MG/1
100 CAPSULE, LIQUID FILLED ORAL
Status: DISCONTINUED | OUTPATIENT
Start: 2019-03-31 | End: 2019-04-02 | Stop reason: HOSPADM

## 2019-03-31 RX ORDER — OXYTOCIN/RINGER'S LACTATE 20/1000 ML
999 PLASTIC BAG, INJECTION (ML) INTRAVENOUS AS NEEDED
Status: DISCONTINUED | OUTPATIENT
Start: 2019-03-31 | End: 2019-04-02 | Stop reason: HOSPADM

## 2019-03-31 RX ORDER — NALBUPHINE HYDROCHLORIDE 10 MG/ML
2.5 INJECTION, SOLUTION INTRAMUSCULAR; INTRAVENOUS; SUBCUTANEOUS
Status: DISCONTINUED | OUTPATIENT
Start: 2019-03-31 | End: 2019-04-02 | Stop reason: HOSPADM

## 2019-03-31 RX ORDER — OXYTOCIN/RINGER'S LACTATE 20/1000 ML
125 PLASTIC BAG, INJECTION (ML) INTRAVENOUS AS NEEDED
Status: DISCONTINUED | OUTPATIENT
Start: 2019-03-31 | End: 2019-04-02 | Stop reason: HOSPADM

## 2019-03-31 RX ORDER — NALOXONE HYDROCHLORIDE 0.4 MG/ML
0.4 INJECTION, SOLUTION INTRAMUSCULAR; INTRAVENOUS; SUBCUTANEOUS AS NEEDED
Status: DISCONTINUED | OUTPATIENT
Start: 2019-03-31 | End: 2019-04-02 | Stop reason: HOSPADM

## 2019-03-31 RX ORDER — SIMETHICONE 80 MG
80 TABLET,CHEWABLE ORAL
Status: DISCONTINUED | OUTPATIENT
Start: 2019-03-31 | End: 2019-04-02 | Stop reason: HOSPADM

## 2019-03-31 RX ORDER — OXYCODONE AND ACETAMINOPHEN 5; 325 MG/1; MG/1
1 TABLET ORAL
Status: DISCONTINUED | OUTPATIENT
Start: 2019-03-31 | End: 2019-04-02 | Stop reason: HOSPADM

## 2019-03-31 RX ORDER — CEFAZOLIN SODIUM/WATER 2 G/20 ML
2 SYRINGE (ML) INTRAVENOUS ONCE
Status: COMPLETED | OUTPATIENT
Start: 2019-03-31 | End: 2019-03-31

## 2019-03-31 RX ORDER — PHENYLEPHRINE HCL IN 0.9% NACL 0.4MG/10ML
SYRINGE (ML) INTRAVENOUS AS NEEDED
Status: DISCONTINUED | OUTPATIENT
Start: 2019-03-31 | End: 2019-03-31 | Stop reason: HOSPADM

## 2019-03-31 RX ORDER — LEVOTHYROXINE SODIUM 50 UG/1
TABLET ORAL
COMMUNITY

## 2019-03-31 RX ADMIN — EPHEDRINE SULFATE 10 MG: 50 INJECTION, SOLUTION INTRAVENOUS at 06:52

## 2019-03-31 RX ADMIN — OXYCODONE AND ACETAMINOPHEN 1 TABLET: 5; 325 TABLET ORAL at 20:55

## 2019-03-31 RX ADMIN — BUPIVACAINE HYDROCHLORIDE 14 MG: 7.5 INJECTION, SOLUTION EPIDURAL; RETROBULBAR at 06:31

## 2019-03-31 RX ADMIN — Medication 20 UNITS/HR: at 06:48

## 2019-03-31 RX ADMIN — EPHEDRINE SULFATE 5 MG: 50 INJECTION, SOLUTION INTRAVENOUS at 07:09

## 2019-03-31 RX ADMIN — EPHEDRINE SULFATE 10 MG: 50 INJECTION, SOLUTION INTRAVENOUS at 07:27

## 2019-03-31 RX ADMIN — Medication 2 G: at 06:18

## 2019-03-31 RX ADMIN — Medication 40 MCG: at 06:42

## 2019-03-31 RX ADMIN — EPHEDRINE SULFATE 5 MG: 50 INJECTION, SOLUTION INTRAVENOUS at 07:01

## 2019-03-31 RX ADMIN — MORPHINE SULFATE 250 MCG: 0.5 INJECTION, SOLUTION EPIDURAL; INTRATHECAL; INTRAVENOUS at 06:31

## 2019-03-31 RX ADMIN — DIPHENHYDRAMINE HYDROCHLORIDE 25 MG: 25 CAPSULE ORAL at 22:28

## 2019-03-31 RX ADMIN — ACETAMINOPHEN 1000 MG: 10 INJECTION, SOLUTION INTRAVENOUS at 06:59

## 2019-03-31 RX ADMIN — ONDANSETRON 4 MG: 2 INJECTION INTRAMUSCULAR; INTRAVENOUS at 06:53

## 2019-03-31 RX ADMIN — SODIUM CHLORIDE, SODIUM LACTATE, POTASSIUM CHLORIDE, AND CALCIUM CHLORIDE 125 ML/HR: 600; 310; 30; 20 INJECTION, SOLUTION INTRAVENOUS at 08:10

## 2019-03-31 RX ADMIN — KETOROLAC TROMETHAMINE 30 MG: 30 INJECTION, SOLUTION INTRAMUSCULAR; INTRAVENOUS at 14:06

## 2019-03-31 NOTE — PROGRESS NOTES
TRANSFER - IN REPORT:    Verbal report received from Cherelle Cheng RN(name) on [address]  being received from L&D(unit) for routine progression of care      Report consisted of patients Situation, Background, Assessment and   Recommendations(SBAR). Information from the following report(s) SBAR, Kardex, Intake/Output, MAR and Recent Results was reviewed with the receiving nurse. Opportunity for questions and clarification was provided. Assessment completed upon patients arrival to unit and care assumed. 1300  Mews score 4 - Pulse elevated (116). Patient is grieving loss of one son, other son remains critical.    1406  Medicated with 30 mg Toradol IV for general discomfort. 1410  Patient assisted into WC to go to NICU with . 1620  Patient and  each holding a son. Very appropriate behavior. Crying at times, holding hands, touching babies. Patient states she is comfortable at present. Desires no visitors. Encouraged to drink fluids as IV infiltrated. [address] at bedside discussing options for burial.  Parents tearful. Patient has babies in her lap. 1900  NICU nurses leaving room. Parents calm but tearful. Mom now snuggling babies at her chest.  Parents are expecting some visitors soon. Will leave in slade for now so that grieving process not interrupted.

## 2019-03-31 NOTE — PROGRESS NOTES
responded to called as pt's second baby passed family requesting prayer.  provided pastoral support and prayer.  follow up as needed.      RENATE HoDiv, [facility]   [phone]

## 2019-03-31 NOTE — ANESTHESIA POSTPROCEDURE EVALUATION
Procedure(s):  SECTION. spinal 
 
Anesthesia Post Evaluation Patient location during evaluation: bedside Patient participation: complete - patient participated Level of consciousness: awake and alert Pain management: adequate Airway patency: patent Anesthetic complications: no 
Cardiovascular status: acceptable Respiratory status: acceptable Hydration status: acceptable Post anesthesia nausea and vomiting:  none No vitals data found for the desired time range.

## 2019-03-31 NOTE — ANESTHESIA PREPROCEDURE EVALUATION
Relevant Problems No relevant active problems Anesthetic History No history of anesthetic complications Review of Systems / Medical History Patient summary reviewed, nursing notes reviewed and pertinent labs reviewed Pulmonary Within defined limits Neuro/Psych Within defined limits Cardiovascular Exercise tolerance: >4 METS 
  
GI/Hepatic/Renal 
Within defined limits Endo/Other Within defined limits Other Findings Comments:  labor in second trimester, twins Physical Exam 
 
Airway Mallampati: II 
TM Distance: 4 - 6 cm Neck ROM: normal range of motion Mouth opening: Normal 
 
 Cardiovascular Regular rate and rhythm,  S1 and S2 normal,  no murmur, click, rub, or gallop Rhythm: regular Rate: normal 
 
 
 
 Dental 
No notable dental hx Pulmonary Breath sounds clear to auscultation Abdominal 
GI exam deferred Other Findings Anesthetic Plan ASA: 2, emergent Anesthesia type: spinal and general - backup Anesthetic plan and risks discussed with: Patient

## 2019-03-31 NOTE — PROGRESS NOTES
3123 Bedside report received from SABINA Del Angel RN.   1130 Pt taken to NICU via stretcher to spend time with her infants. Both infants in critical condition, NICU MD requested parents to be at their bedside for further decision making. RN will remain at pt's bedside during entire NICU visit. 1255 Pt transferred to [facility] via stretcher after Baby A's passing. Plan for NICU RN to bring Baby A to room for bath and bonding. 1305 TRANSFER - OUT REPORT:    Verbal report given to Santo Oconnell RN on Paige Patrick  being transferred to [facility] for routine post - op       Report consisted of patients Situation, Background, Assessment and   Recommendations(SBAR). Information from the following report(s) SBAR, Intake/Output, MAR and Recent Results was reviewed with the receiving nurse. Lines:   Peripheral IV 03/31/19 (Active)        Opportunity for questions and clarification was provided.       Patient transported with:   Registered Nurse

## 2019-03-31 NOTE — ANESTHESIA PROCEDURE NOTES
Spinal Block Performed by: Tanya Pemberton DO Authorized by: Tanya Pemberton DO  
 
Pre-procedure: Indications: primary anesthetic  Preanesthetic Checklist: patient identified, risks and benefits discussed, anesthesia consent, site marked, patient being monitored and timeout performed Spinal Block:  
Patient Position:  Seated Prep: Betadine Location:  L3-4 Technique:  Single shot Needle:  
Needle Type:  Pencil-tip Needle Gauge:  25 G Attempts:  2 Events: CSF confirmed, no blood with aspiration and no paresthesia Assessment: 
Insertion:  Uncomplicated Patient tolerance:  Patient tolerated the procedure well with no immediate complications

## 2019-03-31 NOTE — PROGRESS NOTES
3/31/19  0600 Patient arrived to LDR 1 with complaints of bleeding and cramping that woke her up in the middle of the night. Pregnancy IVF Di/Di twins. Dr Lowery  at bedside. SVE 10/100. BBOW. Baby A, FHR obtained 145. Baby B unable to obtain [address] prior to c/s.    7314 Emergency c/s called. 4040 Patient in OR 2. Per MD, no FHT obtained in OR due to acuity. 2891  Patient out of OR2, taken back to room 1 for recovery. Bedside and Verbal shift change report given to Kamran Romo (oncoming nurse) by Chip HONEYCUTT (offgoing nurse). Report included the following information SBAR, Kardex, Intake/Output, MAR, Accordion, Recent Results and Med Rec Status.

## 2019-03-31 NOTE — PROGRESS NOTES
Spiritual Care Assessment/Progress Note  [facility]      NAME: Milad Taylor      MRN: [id number]  AGE: 35 y.o. SEX: female  Rastafarian Affiliation: Mormon   Language: Chinese     3/31/2019     Total Time (in minutes): 80     Spiritual Assessment begun in [address] through conversation with:         [x]Patient        [x] Family    [] Friend(s)        Reason for Consult: Crisis, Sikhism/blessing request     Spiritual beliefs: (Please include comment if needed)     [x] Identifies with a jake tradition:         [] Supported by a jake community:            [] Claims no spiritual orientation:           [] Seeking spiritual identity:                [] Adheres to an individual form of spirituality:           [] Not able to assess:                           Identified resources for coping:      [x] Prayer                               [] Music                  [] Guided Imagery     [x] Family/friends                 [] Pet visits     [] Devotional reading                         [] Unknown     [] Other:                                               Interventions offered during this visit: (See comments for more details)    Patient Interventions: Affirmation of emotions/emotional suffering, Affirmation of jake, Normalization of emotional/spiritual concerns, Prayer (assurance of), Prayer (actual), Iconic (affirming the presence of God/Higher Power), Crisis     Family/Friend(s):  Affirmation of emotions/emotional suffering, Affirmation of jake, Sikhism/blessing, Iconic (affirming the presence of God/Higher Power), Normalization of emotional/spiritual concerns, Prayer (actual), Prayer (assurance of)     Plan of Care:     [x] Support spiritual and/or cultural needs    [] Support AMD and/or advance care planning process      [] Support grieving process   [] Coordinate Rites and/or Rituals    [] Coordination with community clergy   [] No spiritual needs identified at this time   [] Detailed Plan of Care below (See Comments)  [] Make referral to Music Therapy  [] Make referral to Pet Therapy     [] Make referral to Addiction services  [] Make referral to [facility]  [] Make referral to Spiritual Care Partner  [] No future visits requested        [x] Follow up visits as needed     Comments:   responded to crisis as pt delivered twins. Babies were not doing well and pt and family requested Worship/blessing.  provided Worship/blessing.  provided pastoral support, comfort, blessing and prayer. Please contact [phone] [facility] for further support.  follow up as needed.      Alejo Ho, [facility]   [phone]

## 2019-03-31 NOTE — PROGRESS NOTES
0803 25 and 30 minute Apgar scores for Baby Boy B          25 minutes   30 minutes  Skin Color            0    0   Heart Rate            1    2  Reflex Irritability          0     1  Muscle Tone           0    1   Respiratory Effort      __0____                      ____1___     Total            1    5    Apgar scores assigned by Dr. Leon Mitchell, NICU, documentation by [facility]

## 2019-03-31 NOTE — OP NOTES
Operative Note    Name: [facility],[address] Record Number: [id number]   YOB: 1985  Today's Date: 2019      Pre-operative Diagnosis: 1.  @ 23 2/7 wks with IVF di/di twins      2.  labor, fully dilated on arrival      3. Breech presentation    Post-operative Diagnosis: Same as above, with delivery      Twin A 650 g Apgars 1, 2, 5        Twin B 525 g Apgars 1, 1, 1, 2, 1, 5    Operation: Primary Low Transverse  SECTION    Surgeon(s):  Pau Grayson MD     Assistant: Berna Wagner RN    Anesthesia: Spinal    Prophylactic Antibiotics: Ancef  DVT Prophylaxis: Sequential Compression Devices         Fetal Description: multiple gestation 2     Birth Information:   Information for the patient's :  MILAGRO Patrick 1 estefani [[id number]]   Delivery of a   male infant on 3/31/2019 at 6:45 AM. Apgars were   and  . Umbilical Cord: 3 Vessels     Umbilical Cord Events: None     Placenta: Manual Removal removal with Normal;Intact appearance. Amniotic Fluid Volume: Moderate     Amniotic Fluid Description:       Information for the patient's :  Milagro Patrick 2 estefani [[id number]]   Delivery of a 0.525 kg male infant on 3/31/2019 at 6:46 AM. Apgars were   and  . Umbilical Cord: 3 Vessels     Umbilical Cord Events: None     Placenta: Expressed removal with Normal appearance. Amniotic Fluid Volume: Moderate     Amniotic Fluid Description:           Umbilical Cord: 3 vessels present    Placenta:  expressed    Specimens: placentas           Complications:  none    Procedure Detail:      After proper patient identification and consent, the patient was taken to the operating room, where spinal anesthesia was administered and found to be adequate. Esquivel catheter had been placed using sterile technique. The patient was prepped and draped in the normal sterile fashion. The abdomen was entered using the Pfannenstiel technique.  The peritoneum was entered bluntly well superior to the bladder without any apparent injury. The Piero retractor was placed and the bladder flap was created without difficulty. A low transverse uterine incision was made with the scalpel and extended with blunt finger dissection. Baby A had an anterior placenta that was cut through. Amniotomy was performed and the fluid was medium amount clear. The baby delivered atraumatically in footling breech position. No delay with cord clamping due to cutting through the anterior placenta. The cord was clamped and cut and the baby was handed off to  staff in attendance. Baby B's bag was AROM'd and a moderate amount of clear fluid was seen. The baby delivered atraumatically in footling breech position. The uterus was curettaged with a moist lap pad and cleared of all clots and debris. The uterine incision was closed with 0 monocryl in running locking fashion with good hemostasis assured followed by an embricating stitch. The pelvis was irrigated with warm normal saline. Good hemostasis was again reassured and the uterus was returned to the abdomen. The retractor was removed. The peritoneum & rectus muscles were reapproximated with 2.0 monocryl. The fascia was closed with 0 Vicryl in a running fashion. Good hemostasis was assured. The skin was closed with a 3.0 monocryl subcuticular closure. The patient tolerated the procedure well. Sponge, lap, and needle counts were correct times three and the patient was taken to recovery/postpartum room in stable condition. The babies (Julian Mathur) were taken to the NICU intubated but guarded.     Ellie Bridges MD  2019  7:59 AM

## 2019-03-31 NOTE — H&P
Labor and Delivery Admission Note    CC: pain and bleeding    3/31/2019    35 y.o., , female,  @ 23 2/7 wks with twin gestation presents at 0 with complaining of onset of lower abdominal pain q 1-2 minutes since 0400. She also reports onset of vaginal bleeding around that same time. She has had no big gush of fluid. This pregnancy was conceived via IVF and they are di/di twins. Patient denies any complications. Pregnancy supervised by Dr. Maddy Mahan. She reports a normal US and normal cervical length less than a week ago. She denies any trauma or recent IC. PNC: Blood type: O            RH: pos            Ab screen: negative            HBsAg: negative            DM Screen: not yet done            RPR/VDRL: non reactive            HIV: negative            GC/Chlamydia: negative            Rubella: immune            SVII serology: no history              GBS status: not yet done    Past Medical History:   Diagnosis Date    Infertility, female     IVF with [facility] to conceive     Past Surgical History:   Procedure Laterality Date    BREAST SURGERY PROCEDURE UNLISTED      breast implants     HX TONSILLECTOMY Bilateral      OB/GYN: G1 full term vaginal delivery approximately 2 years ago                  G2 current  Meds:   Current Facility-Administered Medications   Medication Dose Route Frequency    lactated ringers bolus infusion 1,000 mL  1,000 mL IntraVENous ONCE    lactated ringers bolus infusion 300-1,000 mL  300-1,000 mL IntraVENous PRN    oxytocin (PITOCIN) 20 units/1000 ml LR  125-1,000 mL/hr IntraVENous TITRATE    ceFAZolin (ANCEF) 2 g/20 mL in sterile water IV syringe  2 g IntraVENous ONCE     Allergies: No Known Allergies  Pertinent ROS: as per HPI o/w negative   No family history on file.   Social History     Socioeconomic History    Marital status:      Spouse name: Not on file    Number of children: Not on file    Years of education: Not on file    Highest education level: Not on file   Occupational History    Not on file   Social Needs    Financial resource strain: Not on file    Food insecurity:     Worry: Not on file     Inability: Not on file    Transportation needs:     Medical: Not on file     Non-medical: Not on file   Tobacco Use    Smoking status: Never Smoker    Smokeless tobacco: Never Used   Substance and Sexual Activity    Alcohol use: No    Drug use: No    Sexual activity: Yes     Partners: Male     Birth control/protection: None   Lifestyle    Physical activity:     Days per week: Not on file     Minutes per session: Not on file    Stress: Not on file   Relationships    Social connections:     Talks on phone: Not on file     Gets together: Not on file     Attends Oriental orthodox service: Not on file     Active member of club or organization: Not on file     Attends meetings of clubs or organizations: Not on file     Relationship status: Not on file    Intimate partner violence:     Fear of current or ex partner: Not on file     Emotionally abused: Not on file     Physically abused: Not on file     Forced sexual activity: Not on file   Other Topics Concern    Not on file   Social History Narrative    Not on file       OBJECTIVE:  Gravid female NAD  No data recorded. There were no vitals taken for this visit. Labs:    Lab Results   Component Value Date/Time    WBC 14.8 (H) 05/22/2017 04:46 PM       Exam:  HEENT:  normal   Lungs:  Normal respiratory effort  Cor:  Well perfused  Abdomen:  Soft, thin, palpable contractions  Fetal heart rate tracing:  FHTs x 2  Contraction pattern: q 2 minutes  Cervix:  Fully dilated with bulging membranes, no presenting part palpated  Fluid:  intact  Pelvimetry:  AP-good                      Arch- adequate                      Sidewalls- adequate                      Pelvis feels adequate for fetus. Impression:  IUP at 23 2/7 weeks with di/di twins and advanced PTL.     Plan: Discussed with patient and partner periviable gestational age. Offered vaginal delivery vs  and recommended  if they were opting for fetal resuscitation. They are interested in full resuscitation. Reviewed risk of  to include bleeding, infection, injury to surrounding organs, babies, etc.  They accept these risks and also consent to blood transfusion if necessary. They are aware that she may need general anesthesia but will live that decision to the anesthesiologist.  Proceed with  as soon as possible. NICU notified. Due to emergent need for delivery, there was no time for  steroids.       Reynaldo Rosales MD

## 2019-04-01 LAB
BASOPHILS # BLD: 0 K/UL (ref 0–0.1)
BASOPHILS NFR BLD: 0 % (ref 0–1)
DIFFERENTIAL METHOD BLD: ABNORMAL
EOSINOPHIL # BLD: 0 K/UL (ref 0–0.4)
EOSINOPHIL NFR BLD: 0 % (ref 0–7)
ERYTHROCYTE [DISTWIDTH] IN BLOOD BY AUTOMATED COUNT: 13.3 % (ref 11.5–14.5)
HCT VFR BLD AUTO: 35.8 % (ref 35–47)
HGB BLD-MCNC: 12.1 G/DL (ref 11.5–16)
IMM GRANULOCYTES # BLD AUTO: 0 K/UL (ref 0–0.04)
IMM GRANULOCYTES NFR BLD AUTO: 0 % (ref 0–0.5)
LYMPHOCYTES # BLD: 2 K/UL (ref 0.8–3.5)
LYMPHOCYTES NFR BLD: 18 % (ref 12–49)
MCH RBC QN AUTO: 33.9 PG (ref 26–34)
MCHC RBC AUTO-ENTMCNC: 33.8 G/DL (ref 30–36.5)
MCV RBC AUTO: 100.3 FL (ref 80–99)
MONOCYTES # BLD: 0.6 K/UL (ref 0–1)
MONOCYTES NFR BLD: 6 % (ref 5–13)
NEUTS SEG # BLD: 8.2 K/UL (ref 1.8–8)
NEUTS SEG NFR BLD: 76 % (ref 32–75)
NRBC # BLD: 0 K/UL (ref 0–0.01)
NRBC BLD-RTO: 0 PER 100 WBC
PLATELET # BLD AUTO: 275 K/UL (ref 150–400)
PMV BLD AUTO: 9.6 FL (ref 8.9–12.9)
RBC # BLD AUTO: 3.57 M/UL (ref 3.8–5.2)
WBC # BLD AUTO: 10.9 K/UL (ref 3.6–11)

## 2019-04-01 PROCEDURE — 65410000002 HC RM PRIVATE OB

## 2019-04-01 PROCEDURE — 85025 COMPLETE CBC W/AUTO DIFF WBC: CPT

## 2019-04-01 PROCEDURE — 36415 COLL VENOUS BLD VENIPUNCTURE: CPT

## 2019-04-01 PROCEDURE — 74011250637 HC RX REV CODE- 250/637: Performed by: OBSTETRICS & GYNECOLOGY

## 2019-04-01 RX ADMIN — IBUPROFEN 800 MG: 400 TABLET, FILM COATED ORAL at 15:33

## 2019-04-01 RX ADMIN — ZOLPIDEM TARTRATE 5 MG: 5 TABLET ORAL at 23:14

## 2019-04-01 RX ADMIN — OXYCODONE AND ACETAMINOPHEN 1 TABLET: 5; 325 TABLET ORAL at 23:14

## 2019-04-01 RX ADMIN — IBUPROFEN 800 MG: 400 TABLET, FILM COATED ORAL at 08:57

## 2019-04-01 RX ADMIN — IBUPROFEN 800 MG: 400 TABLET, FILM COATED ORAL at 00:30

## 2019-04-01 RX ADMIN — OXYCODONE AND ACETAMINOPHEN 1 TABLET: 5; 325 TABLET ORAL at 19:35

## 2019-04-01 RX ADMIN — DIPHENHYDRAMINE HYDROCHLORIDE 25 MG: 25 CAPSULE ORAL at 11:38

## 2019-04-01 NOTE — PROGRESS NOTES
Cm was asked to meet with patient and spouse to answer questions they had. Patient wanted to know if her babies would have a social security number. Cm informed her that a representative from [employer] would be coming to talk with her and would be able to answer that question. Patient also asked if she should notify her insurance company of their birth and death, cm suggested she do so. Cm provided her with an application for the Care Card to assist with any costs her insurance would not cover.     Beth LOPEZW, ACM

## 2019-04-01 NOTE — PROGRESS NOTES
Problem: Pain  Goal: *Control of Pain  Outcome: Progressing Towards Goal     Problem: Patient Education: Go to Patient Education Activity  Goal: Patient/Family Education  Outcome: Progressing Towards Goal

## 2019-04-01 NOTE — PROGRESS NOTES
0830 Bedside shift change report given to Radha RN (oncoming nurse) by Edwina Yoo RN (offgoing nurse). Report included the following information SBAR and MAR.

## 2019-04-01 NOTE — PROGRESS NOTES
1950 Verbal shift change report given to Bia Moses RN / Tristian Camargo (oncoming nurse) by Noe Gonzalez RN (offgoing nurse). Report included the following information SBAR, Kardex, Procedure Summary, Intake/Output, MAR, Accordion, Recent Results and Med Rec Status. Kristian General RN and Oncoming RN entered room to introduce new shift. Pt is resting in bed doing skin to skin with both infants. I advised pt to let primary RN know if there is anything we can do for the patient or her family. Pt does state she is starting to experience some pain, but not enough for the IM Morphine. I advised I will contact that Anesthesiologist to get authorization to give oral pain medications being that it has been less than 24 hours since anesthesia. Student RN was not involved in care at this time. 2007 Paged Anesthesiologist regarding oral Percocet. 2049 Called Anesthesiologist at ext. Carolyn Vigil. Received verbal approval for pt to have Percocet within 24 hours of Anesthesiology. 2054 In pt's room for assessment and to give Percocet. Pt and  requested NICU to be called for the infants. Call placed to NICU at 2100.

## 2019-04-01 NOTE — PROGRESS NOTES
Post-Operative Day Number 1 Progress Note    Patient doing well post-op day 1 from  delivery without significant complaints. Pain controlled on current medication. Voiding without difficulty, normal lochia. Both infant boys passed away yesterday. Patient is appropriately upset. Reports decreased appetite, denies nausea. Vitals:    Patient Vitals for the past 8 hrs:   BP Temp Pulse Resp SpO2   19 0634 94/66 98.4 °F (36.9 °C) 91 14 95 %     Temp (24hrs), Av.3 °F (36.8 °C), Min:97.5 °F (36.4 °C), Max:98.8 °F (37.1 °C)      Vital signs stable, afebrile. Exam:  Patient without distress. Abdomen soft, fundus firm at level of umbilicus, non tender. Incision dry and clean without erythema. Lower extremities are negative for swelling, cords or tenderness. Lab/Data Review: All lab results for the last 24 hours reviewed. Assessment and Plan:    [id number] s/p PLTCS for PTL, fully dilated on arrival, breech presentation twin A. Twin A and B have both passed away. Pt grieving their loss. Provided support this AM.     Patient appears to be having uncomplicated post- course. Benign labs. Continue routine post-op care and maternal education.   Reviewed possible discharge tomorrow Clemencia Martinez MD   2019  7:35 AM

## 2019-04-01 NOTE — PROGRESS NOTES
NUTRITION       Nutrition screening referral was triggered based on results obtained during nursing admission assessment for multi-gestation. Both premature babies were born yesterday, and have passed away. No nutritional interventions planned at this time, but please consult RD if services are needed. Thank you.      Mark Crabtree, [address], [address] [facility]

## 2019-04-01 NOTE — PROGRESS NOTES
Bedside and Verbal shift change report given to [employer] (oncoming nurse) by Goran Howard RN (offgoing nurse). Report included the following information SBAR, Kardex, Intake/Output, MAR, Accordion and Recent Results.

## 2019-04-02 VITALS
HEIGHT: 67 IN | BODY MASS INDEX: 28.56 KG/M2 | SYSTOLIC BLOOD PRESSURE: 104 MMHG | DIASTOLIC BLOOD PRESSURE: 71 MMHG | WEIGHT: 182 LBS | HEART RATE: 87 BPM | OXYGEN SATURATION: 96 % | RESPIRATION RATE: 16 BRPM | TEMPERATURE: 98 F

## 2019-04-02 PROCEDURE — 74011250637 HC RX REV CODE- 250/637: Performed by: OBSTETRICS & GYNECOLOGY

## 2019-04-02 RX ORDER — IBUPROFEN 800 MG/1
800 TABLET ORAL
Qty: 30 TAB | Refills: 1 | Status: SHIPPED | OUTPATIENT
Start: 2019-04-02

## 2019-04-02 RX ORDER — OXYCODONE AND ACETAMINOPHEN 5; 325 MG/1; MG/1
1 TABLET ORAL
Qty: 25 TAB | Refills: 0 | Status: SHIPPED | OUTPATIENT
Start: 2019-04-02 | End: 2019-04-07

## 2019-04-02 RX ORDER — ZOLPIDEM TARTRATE 5 MG/1
5 TABLET ORAL
Qty: 20 TAB | Refills: 0 | Status: SHIPPED | OUTPATIENT
Start: 2019-04-02

## 2019-04-02 RX ORDER — DOCUSATE SODIUM 100 MG/1
100 CAPSULE, LIQUID FILLED ORAL
Qty: 30 CAP | Refills: 1 | Status: SHIPPED | OUTPATIENT
Start: 2019-04-02 | End: 2019-07-01

## 2019-04-02 RX ADMIN — IBUPROFEN 800 MG: 400 TABLET, FILM COATED ORAL at 00:03

## 2019-04-02 RX ADMIN — IBUPROFEN 800 MG: 400 TABLET, FILM COATED ORAL at 09:47

## 2019-04-02 RX ADMIN — OXYCODONE AND ACETAMINOPHEN 1 TABLET: 5; 325 TABLET ORAL at 08:00

## 2019-04-02 NOTE — PROGRESS NOTES
Bedside and Verbal shift change report given to ELENA Ray RN (oncoming nurse) by EMILY Coughlin RN (offgoing nurse). Report included the following information SBAR, Kardex, OR Summary, Procedure Summary, Intake/Output, MAR, Accordion, Recent Results and Med Rec Status.

## 2019-04-02 NOTE — DISCHARGE INSTRUCTIONS
POSTPARTUM DISCHARGE INSTRUCTIONS       Name:  Tianna Blair  YOB: 1985  Admission Diagnosis:   labor in second trimester [O60.02]     Discharge Diagnosis:    Problem List as of 2019 Never Reviewed          Codes Class Noted - Resolved     labor in second trimester ICD-10-CM: O60.02  ICD-9-CM: 644.20  3/31/2019 - Present        Pregnancy ICD-10-CM: Z34.90  ICD-9-CM: V22.2  2017 - Present            Attending Physician:  Jamie Witt MD    Delivery Type:   Section: What to Expect at Home    Your Recovery:  A  section, or , is surgery to deliver your baby through a cut, called an incision that the doctor makes in your lower belly and uterus. You may have some pain in your lower belly and need pain medicine for 1 to 2 weeks. You can expect some vaginal bleeding for several weeks. You will probably need about 6 weeks to fully recover. It is important to take it easy while the incision is healing. Avoid heavy lifting, strenuous activities, or exercises that strain the belly muscles while you are recovering. Ask a family member or friend for help with housework, cooking, and shopping. This care sheet gives you a general idea about how long it will take for you to recover. But each person recovers at a different pace. Follow the steps below to get better as quickly as possible. How can you care for yourself at home? Activity  · Rest when you feel tired. Getting enough sleep will help you recover. · Try to walk each day. Start by walking a little more than you did the day before. Bit by bit, increase the amount you walk. Walking boosts blood flow and helps prevent pneumonia, constipation, and blood clots. · Avoid strenuous activities, such as bicycle riding, jogging, weightlifting, and aerobic exercise, for 6 weeks or until your doctor says it is okay. · Until your doctor says it is okay, do not lift anything heavier than your baby.   · Do not do sit-ups or other exercise that strain the belly muscles for 6 weeks or until your doctor says it is okay. · Hold a pillow over your incision when you cough or take deep breaths. This will support your belly and decrease your pain. · You may shower as usual. Pat the incision dry when you are done. · You will have some vaginal bleeding. Wear sanitary pads. Do not douche or use tampons until your doctor says it is okay. · Ask your doctor when you can drive again. · You will probably need to take at least 6 weeks off work. It depends on the type of work you do and how you feel. · Wait until you are healed (about 4 to 6 weeks) before you have sexual intercourse. Your doctor will tell you when it is okay to have sex. · Talk to your doctor about birth control. You can get pregnant even before your period returns. Also, you can get pregnant while you are breast-feeding. Incision care  Your skin is your body's first line of defense against germs, but an incision site leaves an easy way for germs to enter your body. To prevent infection:  · Clean your hands frequently and before and after changing any touching any dressings. · If you have strips of tape on the incision, leave the tape on for a week or until it falls off. · Look at your incision closely every day for any changes. Contact your doctor if you experience any signs of infection, such as fever or increased redness at the surgical site. · Wash the area daily with warm, soapy water, and pat it dry. Don't use hydrogen peroxide or alcohol, which can slow healing. You may cover the area with a gauze bandage if it weeps or rubs against clothing. Change the bandage every day. · Keep the area clean and dry. Diet  · You can eat your normal diet. If your stomach is upset, try bland, low-fat foods like plain rice, broiled chicken, toast, and yogurt. · Drink plenty of fluids (unless your doctor tells you not to).   · You may notice that your bowel movements are not regular right after your surgery. This is common. Try to avoid constipation and straining with bowel movements. You may want to take a fiber supplement every day. If you have not had a bowel movement after a couple of days, ask your doctor about taking a mild laxative. · If you are breast-feeding, do not drink any alcohol. Medicines  · Take pain medicines exactly as directed. · If the doctor gave you a prescription medicine for pain, take it as prescribed. · If you are not taking a prescription pain medicine, ask your doctor if you can take an over-the-counter medicine such as acetaminophen (Tylenol), ibuprofen (Advil, Motrin), or naproxen (Aleve), for cramps. Read and follow all instructions on the label. Do not take aspirin, because it can cause more bleeding. Do not take acetaminophen (Tylenol) and other acetaminophen containing medications (i.e. Percocet) at the same time. · If you think your pain medicine is making you sick to your stomach:  · Take your medicine after meals (unless your doctor has told you not to). · Ask your doctor for a different pain medicine. · If your doctor prescribed antibiotics, take them as directed. Do not stop taking them just because you feel better. You need to take the full course of antibiotics. Mental Health  · Many women get the \"baby blues\" during the first few days after childbirth. You may lose sleep, feel irritable, and cry easily. You may feel happy one minute and sad the next. Hormone changes are one cause of these emotional changes. Also, the demands of a new baby, along with visits from relatives or other family needs, add to a mother's stress. The \"baby blues\" often peak around the fourth day. Then they ease up in less than 2 weeks. · If your moodiness or anxiety lasts for more than 2 weeks, or if you feel like life is not worth living, you may have postpartum depression. This is different for each mother.  Some mothers with serious depression may worry intensely about their infant's well-being. Others may feel distant from their child. Some mothers might even feel that they might harm their baby. A mother may have signs of paranoia, wondering if someone is watching her. · With all the changes in your life, you may not know if you are depressed. Pregnancy sometimes causes changes in how you feel that are similar to the symptoms of depression. · Symptoms of depression include:  · Feeling sad or hopeless and losing interest in daily activities. These are the most common symptoms of depression. · Sleeping too much or not enough. · Feeling tired. You may feel as if you have no energy. · Eating too much or too little. · [employer] ([employer]I) offers a Warm line; Chat with the Expert phone sessions; Information and Articles about Pregnancy and Postpartum Mood Disorders; Comprehensive List of Free Support Groups; Knowledgeable local coordinators who will offer support, information, and resources; Guide to Resources on [employer]; Calendar of events in the  mood disorders community; Latest News and Research; and [facility] [address] for [employer]. Remember - You are not alone; You are not to blame; With help, you will be well. 4-364-146-PPD(4572). WWW. POSTPARTUM. NET   · Writing or talking about death, such as writing suicide notes or talking about guns, knives, or pills. Keep the numbers for these national suicide hotlines: 3-993-735-TALK (2-561-415-380.839.4721) and 6-938-VJPJIUO (9-237.254.3365). If you or someone you know talks about suicide or feeling hopeless, get help right away. Other instructions  · If you breast-feed your baby, you may be more comfortable while you are healing if you place the baby so that he or she is not resting on your belly. Try tucking your baby under your arm, with his or her body along the side you will be feeding on. Support your baby's upper body with your arm.  With that hand you can control your baby's head to bring his or her mouth to your breast. This is sometimes called the football hold. Follow-up care is a key part of your treatment and safety. Be sure to make and go to all appointments, and call your doctor if you are having problems. It's also a good idea to know your test results and keep a list of the medicines you take. When should you call for help? Call 911 anytime you think you may need emergency care. For example, call if:  · You are thinking of hurting yourself, your baby, or anyone else. · You passed out (lost consciousness). · You have symptoms of a blood clot in your lung (called a pulmonary embolism). These may include:  · Sudden chest pain. · Trouble breathing. · Coughing up blood. Call your doctor now or seek immediate medical care if:    · You have severe vaginal bleeding. · You are soaking through a pad each hour for 2 or more hours. · Your vaginal bleeding seems to be getting heavier or is still bright red 4 days after delivery. · You are dizzy or lightheaded, or you feel like you may faint. · You are vomiting or cannot keep fluids down. · You have a fever. · You have new or more belly pain. · You have loose stitches, or your incision comes open. · You have symptoms of infection, such as:  · Increased pain, swelling, warmth, or redness. · Red streaks leading from the incision. · Pus draining from the incision. · A fever  · You pass tissue (not just blood). · Your vaginal discharge smells bad. · Your belly feels tender or full and hard. · Your breasts are continuously painful or red. · You feel sad, anxious, or hopeless for more than a few days. · You have sudden, severe pain in your belly. · You have symptoms of a blood clot in your leg (called a deep vein thrombosis), such as:  · Pain in your calf, back of the knee, thigh, or groin. · Redness and swelling in your leg or groin.   · You have symptoms of preeclampsia, such as:  · Sudden swelling of your face, hands, or feet. · New vision problems (such as dimness or blurring). · A severe headache. · Your blood pressure is higher than it should be or rises suddenly. · You have new nausea or vomiting. Watch closely for changes in your health, and be sure to contact your doctor if you have any problems. Additional Information:  Postpartum Support    PARENTS:  Are you feeling sad or depressed? Is it difficult for you to enjoy yourself? Do you feel more irritable or tense? Do you feel anxious or panicky? Are you having difficulty bonding with your baby? Do you feel as if you are \"out of control\" or \"going crazy\"? Are you worried that you might hurt your baby or yourself? FAMILIES: Do you worry that something is wrong but don't know how to help? Do you think that your partner or spouse is having problems coping? Are you worried that it may never get better? While many women experience some mild mood change or \"the blues\" during or after the birth of a child, 1 in 9 women experience more significant symptoms of depression or anxiety. 1 in 10 Dads become depressed during the first year. Things you can do  Being a good parent includes taking care of yourself. If you take care of yourself, you will be able to take better care of your baby and your family. · Talk to a counselor or healthcare provider who has training in  mood and anxiety problems. · Learn as much as you can about pregnancy and postpartum depression and anxiety. · Get support from family and friends. Ask for help when you need it. · Join a support group in your area or online. · Keep active by walking, stretching or whatever form of exercise helps you to feel better. · Get enough rest and time for yourself. · Eat a healthy diet. · Don't give up! It may take more than one try to get the right help you need.        These are general instructions for a healthy lifestyle:    No smoking/ No tobacco products/ Avoid exposure to second hand smoke    Surgeon General's Warning:  Quitting smoking now greatly reduces serious risk to your health. Obesity, smoking, and sedentary lifestyle greatly increases your risk for illness    A healthy diet, regular physical exercise & weight monitoring are important for maintaining a healthy lifestyle    Recognize signs and symptoms of STROKE:    F-face looks uneven    A-arms unable to move or move unevenly    S-speech slurred or non-existent    T-time-call 911 as soon as signs and symptoms begin - DO NOT go       back to bed or wait to see if you get better - TIME IS BRAIN. I have had the opportunity to make my options or choices for discharge. I have received and understand these instructions.

## 2019-04-02 NOTE — PROGRESS NOTES
Post-Operative  Day 2    Estefani Patrick     Information for the patient's :  Celina BOY 1 estefani [[id number]]   , Low Transverse  Information for the patient's :  Pratik Patrick 2 estefani [[id number]]   , Low Transverse   Patient doing well without unusual complaints. Passing flatus, voiding and ambulating without difficulty. Tolerating regular diet. Vitals:  Visit Vitals  /75 (BP 1 Location: Left arm, BP Patient Position: At rest)   Pulse 97   Temp 97.4 °F (36.3 °C)   Resp 16   Ht 5' 7\" (1.702 m)   Wt 82.6 kg (182 lb)   SpO2 97%   Breastfeeding? No   BMI 28.51 kg/m²     Temp (24hrs), Av.9 °F (36.6 °C), Min:97.4 °F (36.3 °C), Max:98.1 °F (36.7 °C)        Exam:      Patient without distress. Abdomen: soft, expected tenderness, fundus firm                Wound incision clean, dry and intact               Lower extremities are nontender without edema.   No cords    Labs:   Lab Results   Component Value Date/Time    WBC 10.9 2019 11:20 AM    WBC 10.9 2019 06:22 AM    WBC 14.8 (H) 2017 04:46 PM    WBC 12.4 (H) 2017 01:31 AM    HGB 12.1 2019 11:20 AM    HGB 13.1 2019 06:22 AM    HGB 13.3 2017 04:46 PM    HGB 13.1 2017 01:31 AM    HCT 35.8 2019 11:20 AM    HCT 38.1 2019 06:22 AM    HCT 38.2 2017 04:46 PM    HCT 38.5 2017 01:31 AM    PLATELET 069  11:20 AM    PLATELET 476 7890 06:22 AM    PLATELET 990  04:46 PM    PLATELET 977  01:31 AM    Hgb, External 13.8 10/17/2016    Hct, External 41.8 10/17/2016       Recent Results (from the past 24 hour(s))   CBC WITH AUTOMATED DIFF    Collection Time: 19 11:20 AM   Result Value Ref Range    WBC 10.9 3.6 - 11.0 K/uL    RBC 3.57 (L) 3.80 - 5.20 M/uL    HGB 12.1 11.5 - 16.0 g/dL    HCT 35.8 35.0 - 47.0 %    .3 (H) 80.0 - 99.0 FL    MCH 33.9 26.0 - 34.0 PG    MCHC 33.8 30.0 - 36.5 g/dL    RDW 13.3 11.5 - 14.5 %    PLATELET 707 924 - 222 K/uL    MPV 9.6 8.9 - 12.9 FL    NRBC 0.0 0  WBC    ABSOLUTE NRBC 0.00 0.00 - 0.01 K/uL    NEUTROPHILS 76 (H) 32 - 75 %    LYMPHOCYTES 18 12 - 49 %    MONOCYTES 6 5 - 13 %    EOSINOPHILS 0 0 - 7 %    BASOPHILS 0 0 - 1 %    IMMATURE GRANULOCYTES 0 0.0 - 0.5 %    ABS. NEUTROPHILS 8.2 (H) 1.8 - 8.0 K/UL    ABS. LYMPHOCYTES 2.0 0.8 - 3.5 K/UL    ABS. MONOCYTES 0.6 0.0 - 1.0 K/UL    ABS. EOSINOPHILS 0.0 0.0 - 0.4 K/UL    ABS. BASOPHILS 0.0 0.0 - 0.1 K/UL    ABS. IMM. GRANS. 0.0 0.00 - 0.04 K/UL    DF AUTOMATED           Assessment: Post-Op day 2, doing well  Baby boys born at 20 weeks with subsequent demise in the NICU due to prematurity    Plan:   1. Routine post-operative care  2. Early discharge today. 3. Will send pt information for outpatient support groups and counseling due to infant loss  4.  Follow up with primary Ob/Gyn in 1-2 weeks for incision check

## 2019-04-02 NOTE — PROGRESS NOTES
Bedside and Verbal shift change report given to Neela Espinoza RN (oncoming nurse) by Etsher Mcgrath RN (offgoing nurse). Report included the following information SBAR, Kardex, ED Summary, Procedure Summary, Intake/Output, MAR, Accordion and Recent Results. 945 - I have reviewed discharge instructions with the patient and spouse. The patient and spouse verbalized understanding. Patient given copy of discharge instructions and prescriptions. Patient denies any questions at this time. Encouraged to reach out to support groups when ready.

## 2019-04-02 NOTE — DISCHARGE SUMMARY
Obstetrical Discharge Summary     Name: Christina Bowens MRN: [id number]  SSN: [id number]    YOB: 1985  Age: 35 y.o. Sex: female      Allergies: Patient has no known allergies. Admit Date: 3/31/2019    Discharge Date: 2019     Admitting Physician: Sherita Montez MD     Attending Physician:  Ajit Valdez MD     * Admission Diagnoses:  labor in second trimester [O60.02]    * Discharge Diagnoses:   Information for the patient's :  MILAGRO Patrick 1 estefani [[id number]]   Delivery of a   male infant via , Low Transverse on 3/31/2019 at 6:45 AM  by . Apgars were 1 and 2. Information for the patient's :  Milagro Patrick 2 estefani [[id number]]   Delivery of a 0.525 kg male infant via , Low Transverse on 3/31/2019 at 6:46 AM  by . Apgars were 1 and 1. Additional Diagnoses:   Hospital Problems as of 2019 Never Reviewed          Codes Class Noted - Resolved POA     labor in second trimester ICD-10-CM: O60.02  ICD-9-CM: 644.20  3/31/2019 - Present Unknown             Lab Results   Component Value Date/Time    ABO/Rh(D) O POSITIVE 2019 06:22 AM    Rubella, External Immune 10/17/2016    GrBStrep, External Negative 2017    ABO,Rh O positive 10/17/2016    There is no immunization history for the selected administration types on file for this patient. * Procedures: PLTCS  Procedure(s):   SECTION           * Discharge Condition: good    * Hospital Course: Postpartum course was complicated by demise of twin infants in NICU, which added 0 days to the patient's length of stay. * Disposition: Home    Discharge Medications:   Current Discharge Medication List      START taking these medications    Details   docusate sodium (COLACE) 100 mg capsule Take 1 Cap by mouth daily as needed for Constipation for up to 90 days.  Indications: constipation  Qty: 30 Cap, Refills: 1      ibuprofen (MOTRIN) 800 mg tablet Take 1 Tab by mouth every eight (8) hours as needed for Pain. Qty: 30 Tab, Refills: 1    Associated Diagnoses: Pain      oxyCODONE-acetaminophen (PERCOCET) 5-325 mg per tablet Take 1 Tab by mouth every four (4) hours as needed for Pain for up to 5 days. Max Daily Amount: 6 Tabs. Qty: 25 Tab, Refills: 0    Associated Diagnoses: Pain      zolpidem (AMBIEN) 5 mg tablet Take 1 Tab by mouth nightly as needed for Sleep. Max Daily Amount: 5 mg. Indications: Difficulty Falling Asleep  Qty: 20 Tab, Refills: 0    Associated Diagnoses: Psychophysiological insomnia         CONTINUE these medications which have NOT CHANGED    Details   levothyroxine (SYNTHROID) 50 mcg tablet Take  by mouth Daily (before breakfast). Indications: hypothyroidism      PNV No12-Iron-FA-DSS-OM-3 29 mg iron-1 mg -50 mg CPKD Take  by mouth. * Follow-up Care/Patient Instructions:   Activity: No sex for 6 weeks and No driving while on analgesics  Diet: Regular Diet  Wound Care: Keep wound clean and dry    Follow-up Information     Follow up With Specialties Details Why Contact Info    Karen Bush DO Obstetrics & Gynecology, Obstetrics, Gynecology Schedule an appointment as soon as possible for a visit in 1 week For wound re-check [address]  [phone]             Signed By:  Latasha Alcantara DO     April 2, 2019

## 2019-05-03 ENCOUNTER — HOSPITAL ENCOUNTER (OUTPATIENT)
Dept: PERINATAL CARE | Age: 34
Discharge: HOME | End: 2019-05-03
Attending: OBSTETRICS & GYNECOLOGY

## 2019-09-09 LAB
ANTIBODY SCREEN, EXTERNAL: NEGATIVE
HBSAG, EXTERNAL: NEGATIVE
HIV, EXTERNAL: NEGATIVE
RPR, EXTERNAL: NON REACTIVE
RUBELLA, EXTERNAL: NORMAL
TYPE, ABO & RH, EXTERNAL: NORMAL
URINALYSIS, EXTERNAL: NEGATIVE

## 2019-10-02 ENCOUNTER — HOSPITAL ENCOUNTER (OUTPATIENT)
Dept: PERINATAL CARE | Age: 34
Discharge: HOME | End: 2019-10-02
Attending: OBSTETRICS & GYNECOLOGY
Payer: COMMERCIAL

## 2019-10-02 PROCEDURE — 76801 OB US < 14 WKS SINGLE FETUS: CPT | Performed by: OBSTETRICS & GYNECOLOGY

## 2019-10-30 ENCOUNTER — HOSPITAL ENCOUNTER (OUTPATIENT)
Dept: PERINATAL CARE | Age: 34
Discharge: HOME | End: 2019-10-30
Attending: OBSTETRICS & GYNECOLOGY
Payer: COMMERCIAL

## 2019-10-30 PROCEDURE — 76817 TRANSVAGINAL US OBSTETRIC: CPT | Performed by: OBSTETRICS & GYNECOLOGY

## 2019-11-13 ENCOUNTER — HOSPITAL ENCOUNTER (OUTPATIENT)
Dept: PERINATAL CARE | Age: 34
Discharge: HOME | End: 2019-11-13
Attending: OBSTETRICS & GYNECOLOGY
Payer: COMMERCIAL

## 2019-11-13 PROCEDURE — 76805 OB US >/= 14 WKS SNGL FETUS: CPT | Performed by: OBSTETRICS & GYNECOLOGY

## 2019-11-13 PROCEDURE — 76817 TRANSVAGINAL US OBSTETRIC: CPT | Performed by: OBSTETRICS & GYNECOLOGY

## 2019-11-22 ENCOUNTER — HOSPITAL ENCOUNTER (OUTPATIENT)
Dept: PERINATAL CARE | Age: 34
Discharge: HOME | End: 2019-11-22
Attending: OBSTETRICS & GYNECOLOGY
Payer: COMMERCIAL

## 2019-11-22 PROCEDURE — 76817 TRANSVAGINAL US OBSTETRIC: CPT | Performed by: OBSTETRICS & GYNECOLOGY

## 2019-12-10 ENCOUNTER — HOSPITAL ENCOUNTER (OUTPATIENT)
Dept: PERINATAL CARE | Age: 34
Discharge: HOME | End: 2019-12-10
Attending: OBSTETRICS & GYNECOLOGY
Payer: COMMERCIAL

## 2019-12-10 PROCEDURE — 76817 TRANSVAGINAL US OBSTETRIC: CPT | Performed by: OBSTETRICS & GYNECOLOGY

## 2020-03-17 LAB — GRBS, EXTERNAL: NEGATIVE

## 2020-04-14 ENCOUNTER — ANESTHESIA (OUTPATIENT)
Dept: LABOR AND DELIVERY | Age: 35
DRG: 541 | End: 2020-04-14
Payer: COMMERCIAL

## 2020-04-14 ENCOUNTER — HOSPITAL ENCOUNTER (INPATIENT)
Age: 35
LOS: 1 days | Discharge: HOME | DRG: 541 | End: 2020-04-15
Attending: STUDENT IN AN ORGANIZED HEALTH CARE EDUCATION/TRAINING PROGRAM | Admitting: STUDENT IN AN ORGANIZED HEALTH CARE EDUCATION/TRAINING PROGRAM
Payer: COMMERCIAL

## 2020-04-14 ENCOUNTER — ANESTHESIA EVENT (OUTPATIENT)
Dept: LABOR AND DELIVERY | Age: 35
DRG: 541 | End: 2020-04-14
Payer: COMMERCIAL

## 2020-04-14 PROBLEM — Z34.90 PREGNANT: Status: ACTIVE | Noted: 2020-04-14

## 2020-04-14 LAB
ABO + RH BLD: NORMAL
BASOPHILS # BLD: 0 K/UL (ref 0–0.1)
BASOPHILS NFR BLD: 0 % (ref 0–1)
BLOOD GROUP ANTIBODIES SERPL: NORMAL
DIFFERENTIAL METHOD BLD: ABNORMAL
EOSINOPHIL # BLD: 0.1 K/UL (ref 0–0.4)
EOSINOPHIL NFR BLD: 1 % (ref 0–7)
ERYTHROCYTE [DISTWIDTH] IN BLOOD BY AUTOMATED COUNT: 13.2 % (ref 11.5–14.5)
HCT VFR BLD AUTO: 38.6 % (ref 35–47)
HGB BLD-MCNC: 13.5 G/DL (ref 11.5–16)
IMM GRANULOCYTES # BLD AUTO: 0.1 K/UL (ref 0–0.04)
IMM GRANULOCYTES NFR BLD AUTO: 1 % (ref 0–0.5)
LYMPHOCYTES # BLD: 2.6 K/UL (ref 0.8–3.5)
LYMPHOCYTES NFR BLD: 28 % (ref 12–49)
MCH RBC QN AUTO: 34.2 PG (ref 26–34)
MCHC RBC AUTO-ENTMCNC: 35 G/DL (ref 30–36.5)
MCV RBC AUTO: 97.7 FL (ref 80–99)
MONOCYTES # BLD: 0.6 K/UL (ref 0–1)
MONOCYTES NFR BLD: 7 % (ref 5–13)
NEUTS SEG # BLD: 5.8 K/UL (ref 1.8–8)
NEUTS SEG NFR BLD: 63 % (ref 32–75)
NRBC # BLD: 0 K/UL (ref 0–0.01)
NRBC BLD-RTO: 0 PER 100 WBC
PLATELET # BLD AUTO: 297 K/UL (ref 150–400)
PMV BLD AUTO: 9.7 FL (ref 8.9–12.9)
RBC # BLD AUTO: 3.95 M/UL (ref 3.8–5.2)
SPECIMEN EXP DATE BLD: NORMAL
WBC # BLD AUTO: 9.1 K/UL (ref 3.6–11)

## 2020-04-14 PROCEDURE — 76060000078 HC EPIDURAL ANESTHESIA: Performed by: ANESTHESIOLOGY

## 2020-04-14 PROCEDURE — 36415 COLL VENOUS BLD VENIPUNCTURE: CPT

## 2020-04-14 PROCEDURE — 74011250636 HC RX REV CODE- 250/636: Performed by: STUDENT IN AN ORGANIZED HEALTH CARE EDUCATION/TRAINING PROGRAM

## 2020-04-14 PROCEDURE — 86900 BLOOD TYPING SEROLOGIC ABO: CPT

## 2020-04-14 PROCEDURE — 74011250637 HC RX REV CODE- 250/637: Performed by: OBSTETRICS & GYNECOLOGY

## 2020-04-14 PROCEDURE — 75410000000 HC DELIVERY VAGINAL/SINGLE: Performed by: OBSTETRICS & GYNECOLOGY

## 2020-04-14 PROCEDURE — 74011000250 HC RX REV CODE- 250: Performed by: ANESTHESIOLOGY

## 2020-04-14 PROCEDURE — 0KQM0ZZ REPAIR PERINEUM MUSCLE, OPEN APPROACH: ICD-10-PCS | Performed by: OBSTETRICS & GYNECOLOGY

## 2020-04-14 PROCEDURE — 10D17Z9 MANUAL EXTRACTION OF PRODUCTS OF CONCEPTION, RETAINED, VIA NATURAL OR ARTIFICIAL OPENING: ICD-10-PCS | Performed by: OBSTETRICS & GYNECOLOGY

## 2020-04-14 PROCEDURE — 85025 COMPLETE CBC W/AUTO DIFF WBC: CPT

## 2020-04-14 PROCEDURE — 65270000029 HC RM PRIVATE

## 2020-04-14 PROCEDURE — 88307 TISSUE EXAM BY PATHOLOGIST: CPT

## 2020-04-14 PROCEDURE — 3E033VJ INTRODUCTION OF OTHER HORMONE INTO PERIPHERAL VEIN, PERCUTANEOUS APPROACH: ICD-10-PCS | Performed by: OBSTETRICS & GYNECOLOGY

## 2020-04-14 PROCEDURE — 75410000002 HC LABOR FEE PER 1 HR: Performed by: OBSTETRICS & GYNECOLOGY

## 2020-04-14 PROCEDURE — 77030005513 HC CATH URETH FOL11 MDII -B

## 2020-04-14 PROCEDURE — 77030014125 HC TY EPDRL BBMI -B: Performed by: ANESTHESIOLOGY

## 2020-04-14 PROCEDURE — 10907ZC DRAINAGE OF AMNIOTIC FLUID, THERAPEUTIC FROM PRODUCTS OF CONCEPTION, VIA NATURAL OR ARTIFICIAL OPENING: ICD-10-PCS | Performed by: OBSTETRICS & GYNECOLOGY

## 2020-04-14 PROCEDURE — 75410000003 HC RECOV DEL/VAG/CSECN EA 0.5 HR: Performed by: OBSTETRICS & GYNECOLOGY

## 2020-04-14 RX ORDER — LEVOTHYROXINE SODIUM 50 UG/1
50 TABLET ORAL
Status: DISCONTINUED | OUTPATIENT
Start: 2020-04-15 | End: 2020-04-15 | Stop reason: HOSPADM

## 2020-04-14 RX ORDER — HYDROCORTISONE ACETATE PRAMOXINE HCL 2.5; 1 G/100G; G/100G
CREAM TOPICAL AS NEEDED
Status: DISCONTINUED | OUTPATIENT
Start: 2020-04-14 | End: 2020-04-15 | Stop reason: HOSPADM

## 2020-04-14 RX ORDER — DOCUSATE SODIUM 100 MG/1
100 CAPSULE, LIQUID FILLED ORAL
Status: DISCONTINUED | OUTPATIENT
Start: 2020-04-14 | End: 2020-04-15 | Stop reason: HOSPADM

## 2020-04-14 RX ORDER — BUPIVACAINE HYDROCHLORIDE 2.5 MG/ML
INJECTION, SOLUTION EPIDURAL; INFILTRATION; INTRACAUDAL AS NEEDED
Status: DISCONTINUED | OUTPATIENT
Start: 2020-04-14 | End: 2020-04-14 | Stop reason: HOSPADM

## 2020-04-14 RX ORDER — DIPHENHYDRAMINE HCL 25 MG
25 CAPSULE ORAL
Status: DISCONTINUED | OUTPATIENT
Start: 2020-04-14 | End: 2020-04-15 | Stop reason: HOSPADM

## 2020-04-14 RX ORDER — SODIUM CHLORIDE 0.9 % (FLUSH) 0.9 %
5-40 SYRINGE (ML) INJECTION AS NEEDED
Status: DISCONTINUED | OUTPATIENT
Start: 2020-04-14 | End: 2020-04-15

## 2020-04-14 RX ORDER — SODIUM CHLORIDE 0.9 % (FLUSH) 0.9 %
5-40 SYRINGE (ML) INJECTION EVERY 8 HOURS
Status: DISCONTINUED | OUTPATIENT
Start: 2020-04-14 | End: 2020-04-15 | Stop reason: HOSPADM

## 2020-04-14 RX ORDER — LIDOCAINE HYDROCHLORIDE AND EPINEPHRINE 15; 5 MG/ML; UG/ML
INJECTION, SOLUTION EPIDURAL
Status: SHIPPED | OUTPATIENT
Start: 2020-04-14 | End: 2020-04-14

## 2020-04-14 RX ORDER — ZOLPIDEM TARTRATE 5 MG/1
5 TABLET ORAL
Status: DISCONTINUED | OUTPATIENT
Start: 2020-04-14 | End: 2020-04-15 | Stop reason: HOSPADM

## 2020-04-14 RX ORDER — SIMETHICONE 80 MG
80 TABLET,CHEWABLE ORAL
Status: DISCONTINUED | OUTPATIENT
Start: 2020-04-14 | End: 2020-04-15 | Stop reason: HOSPADM

## 2020-04-14 RX ORDER — IBUPROFEN 800 MG/1
800 TABLET ORAL EVERY 8 HOURS
Status: DISCONTINUED | OUTPATIENT
Start: 2020-04-14 | End: 2020-04-15 | Stop reason: HOSPADM

## 2020-04-14 RX ORDER — HYDROCODONE BITARTRATE AND ACETAMINOPHEN 5; 325 MG/1; MG/1
1 TABLET ORAL
Status: DISCONTINUED | OUTPATIENT
Start: 2020-04-14 | End: 2020-04-15 | Stop reason: HOSPADM

## 2020-04-14 RX ORDER — ACETAMINOPHEN 325 MG/1
650 TABLET ORAL
Status: DISCONTINUED | OUTPATIENT
Start: 2020-04-14 | End: 2020-04-15 | Stop reason: HOSPADM

## 2020-04-14 RX ORDER — SODIUM CHLORIDE, SODIUM LACTATE, POTASSIUM CHLORIDE, CALCIUM CHLORIDE 600; 310; 30; 20 MG/100ML; MG/100ML; MG/100ML; MG/100ML
125 INJECTION, SOLUTION INTRAVENOUS CONTINUOUS
Status: DISCONTINUED | OUTPATIENT
Start: 2020-04-14 | End: 2020-04-14 | Stop reason: HOSPADM

## 2020-04-14 RX ORDER — SODIUM CHLORIDE, SODIUM LACTATE, POTASSIUM CHLORIDE, CALCIUM CHLORIDE 600; 310; 30; 20 MG/100ML; MG/100ML; MG/100ML; MG/100ML
125 INJECTION, SOLUTION INTRAVENOUS CONTINUOUS
Status: DISCONTINUED | OUTPATIENT
Start: 2020-04-14 | End: 2020-04-15 | Stop reason: HOSPADM

## 2020-04-14 RX ORDER — NALOXONE HYDROCHLORIDE 0.4 MG/ML
0.4 INJECTION, SOLUTION INTRAMUSCULAR; INTRAVENOUS; SUBCUTANEOUS AS NEEDED
Status: DISCONTINUED | OUTPATIENT
Start: 2020-04-14 | End: 2020-04-15 | Stop reason: HOSPADM

## 2020-04-14 RX ORDER — EPHEDRINE SULFATE/0.9% NACL/PF 50 MG/5 ML
10 SYRINGE (ML) INTRAVENOUS
Status: DISCONTINUED | OUTPATIENT
Start: 2020-04-14 | End: 2020-04-14 | Stop reason: HOSPADM

## 2020-04-14 RX ORDER — OXYTOCIN/0.9 % SODIUM CHLORIDE 30/500 ML
2 PLASTIC BAG, INJECTION (ML) INTRAVENOUS
Status: DISCONTINUED | OUTPATIENT
Start: 2020-04-14 | End: 2020-04-15 | Stop reason: HOSPADM

## 2020-04-14 RX ORDER — OXYTOCIN/0.9 % SODIUM CHLORIDE 20/1000 ML
125-500 PLASTIC BAG, INJECTION (ML) INTRAVENOUS CONTINUOUS
Status: DISCONTINUED | OUTPATIENT
Start: 2020-04-14 | End: 2020-04-15 | Stop reason: HOSPADM

## 2020-04-14 RX ORDER — ONDANSETRON 2 MG/ML
4 INJECTION INTRAMUSCULAR; INTRAVENOUS
Status: DISCONTINUED | OUTPATIENT
Start: 2020-04-14 | End: 2020-04-15 | Stop reason: HOSPADM

## 2020-04-14 RX ORDER — FENTANYL/BUPIVACAINE/NS/PF 2-1250MCG
1-16 PREFILLED PUMP RESERVOIR EPIDURAL CONTINUOUS
Status: DISCONTINUED | OUTPATIENT
Start: 2020-04-14 | End: 2020-04-14 | Stop reason: HOSPADM

## 2020-04-14 RX ADMIN — Medication 12 ML/HR: at 09:28

## 2020-04-14 RX ADMIN — SODIUM CHLORIDE, SODIUM LACTATE, POTASSIUM CHLORIDE, AND CALCIUM CHLORIDE 999 ML/HR: 600; 310; 30; 20 INJECTION, SOLUTION INTRAVENOUS at 07:42

## 2020-04-14 RX ADMIN — SODIUM CHLORIDE, SODIUM LACTATE, POTASSIUM CHLORIDE, AND CALCIUM CHLORIDE 125 ML/HR: 600; 310; 30; 20 INJECTION, SOLUTION INTRAVENOUS at 11:24

## 2020-04-14 RX ADMIN — SODIUM CHLORIDE, SODIUM LACTATE, POTASSIUM CHLORIDE, AND CALCIUM CHLORIDE 999 ML/HR: 600; 310; 30; 20 INJECTION, SOLUTION INTRAVENOUS at 08:44

## 2020-04-14 RX ADMIN — SODIUM CHLORIDE, SODIUM LACTATE, POTASSIUM CHLORIDE, AND CALCIUM CHLORIDE 125 ML/HR: 600; 310; 30; 20 INJECTION, SOLUTION INTRAVENOUS at 07:32

## 2020-04-14 RX ADMIN — OXYTOCIN 2 MILLI-UNITS/MIN: 10 INJECTION INTRAVENOUS at 11:57

## 2020-04-14 RX ADMIN — IBUPROFEN 800 MG: 800 TABLET ORAL at 18:59

## 2020-04-14 RX ADMIN — LIDOCAINE HYDROCHLORIDE AND EPINEPHRINE 0.5 ML: 15; 5 INJECTION, SOLUTION EPIDURAL at 09:10

## 2020-04-14 RX ADMIN — SODIUM CHLORIDE, SODIUM LACTATE, POTASSIUM CHLORIDE, AND CALCIUM CHLORIDE 125 ML/HR: 600; 310; 30; 20 INJECTION, SOLUTION INTRAVENOUS at 15:47

## 2020-04-14 RX ADMIN — BUPIVACAINE HYDROCHLORIDE 10 ML: 2.5 INJECTION, SOLUTION EPIDURAL; INFILTRATION; INTRACAUDAL; PERINEURAL at 09:12

## 2020-04-14 RX ADMIN — Medication 12 ML/HR: at 15:41

## 2020-04-14 NOTE — ANESTHESIA PROCEDURE NOTES
Epidural Block    Start time: 4/14/2020 8:58 AM  End time: 4/14/2020 9:15 AM  Performed by: Alyson Diop MD  Authorized by: Alyson Diop MD     Pre-Procedure  Indication: labor epidural    Preanesthetic Checklist: patient identified, risks and benefits discussed, anesthesia consent, patient being monitored, timeout performed and anesthesia consent    Timeout Time: 08:58        Epidural:   Patient position:  Seated  Prep region:  Lumbar  Prep: Chlorhexidine    Location:  L3-4    Needle and Epidural Catheter:   Needle Type:  Tuohy  Needle Gauge:  17 G  Attempts:  2  Catheter Size:  20 G  Events: no blood with aspiration, no cerebrospinal fluid with aspiration, no paresthesia and negative aspiration test    Test Dose:  Negative    Assessment:   Catheter Secured:  Tegaderm and tape  Insertion:  Uncomplicated  Patient tolerance:  Patient tolerated the procedure well with no immediate complications

## 2020-04-14 NOTE — H&P
History & Physical    Name: Judson Nurse MRN: [id number]  SSN: [id number]    YOB: 1985  Age: 29 y.o. Sex: female        Subjective:     Estimated Date of Delivery: 20  OB History        4    Para   2    Term   1       1    AB   1    Living   3       SAB   1    TAB        Ectopic        Molar        Multiple   1    Live Births   3                MsTima Castellano is admitted with pregnancy at 41w0d for induction of labor. Prenatal course was complicated by  then primary LTCS for  twins (both ) one year ago. note reviewed. planning TOLAC. consented in office. . Please see prenatal records for details. Past Medical History:   Diagnosis Date    Infertility, female     IVF with [facility] to conceive both pregnancies    Thyroid activity decreased 2018    Hypothyrodism     Past Surgical History:   Procedure Laterality Date    BREAST SURGERY PROCEDURE UNLISTED      breast implants     HX  SECTION  2019    Di-Di twins, breech presentation    HX TONSILLECTOMY Bilateral      Social History     Occupational History    Not on file   Tobacco Use    Smoking status: Never Smoker    Smokeless tobacco: Never Used   Substance and Sexual Activity    Alcohol use: No    Drug use: No    Sexual activity: Yes     Partners: Male     Birth control/protection: None     Family History   Problem Relation Age of Onset    No Known Problems Mother     No Known Problems Father        No Known Allergies  Prior to Admission medications    Medication Sig Start Date End Date Taking? Authorizing Provider   levothyroxine (SYNTHROID) 50 mcg tablet Take  by mouth Daily (before breakfast). Indications: hypothyroidism   Yes Provider, Historical   PNV No12-Iron-FA-DSS-OM-3 29 mg iron-1 mg -50 mg CPKD Take  by mouth. Yes Provider, Historical   ibuprofen (MOTRIN) 800 mg tablet Take 1 Tab by mouth every eight (8) hours as needed for Pain.  19   Jarod Ruby, DO   zolpidem (AMBIEN) 5 mg tablet Take 1 Tab by mouth nightly as needed for Sleep. Max Daily Amount: 5 mg. Indications: Difficulty Falling Asleep 19   Hartle, Cecelia Cowden T, DO        Review of Systems: Pertinent items are noted in HPI. Objective:     Vitals:  Vitals:    20 0617 20 0640 20 0723   BP: 90/51  90/54   Pulse: (!) 116  94   Resp: 16  16   Temp: 98.2 °F (36.8 °C)  98.2 °F (36.8 °C)   Weight:  88.9 kg (196 lb)    Height:  5' 7\" (1.702 m)         Physical Exam:  General NAD  CVS: RRR no r/mg  Pulmonary: CTAB  Abdm: gravid NT  Back: KEVIN CVA NT, spine NT  KEVIN LE NT w/o edema  [place]: scant  Membranes:  Artificial Rupture of Membranes; Amniotic Fluid: small amount of thin meconium fluid  Fetal Heart Rate: Reactive  Baseline: 140 per minute  Variability: moderate  Accelerations: yes  Decelerations: none  SVE: /-2 post vtx AROM  EFW: 8       Prenatal Labs:   Lab Results   Component Value Date/Time    Rubella, External Immune 2019    GrBStrep, External Negative 2020    HBsAg, External Negative 2019    HIV, External Negative 2019    RPR, External Non Reactive 2019    Gonorrhea, External Negative 10/17/2016    Chlamydia, External Negative 10/17/2016        Assessment/Plan:     Plan: Admit for IUP 41wks. IOL. TOLAC ( then LTCS one year ago). FWB reassuring. Risks/benefits of TOLAC vs RLTCS reviewed, including but not limited to uterine rupture and potentially higher risk given short pregnancy interval. Epidural and pros/cons reviewed=- planning soon. Will AROM now and start pit prn with internals. All ques answered, pt and family understand and agree. Expectant management. Group B Strep was negative. She is not anemic.       Signed By:  Tosin Duffy MD     2020

## 2020-04-14 NOTE — PROGRESS NOTES
4/14/2020  11:51 AM  CM met with MOB to complete initial assessment and complete discharge planning. MOB verified and confirmed demographics. MOB lives with spouse/FOB-Radu Patrick, and their 3yr son, at the address on file. MOB is not employed and will be home with baby. FOB is employed and will be taking 1 week off. FOB reports good family support, his mother is in town from [facility] and will be staying with them for some time and will be helping MOB. MOB plans to breast feed baby and has pump to use at home. [facility] MOB has car seat, bassinet/crib, clothing, bottles and all necessary supplies for baby. MOB has [employer], and will be adding baby to this policy. CM discussed process to add baby to insurance, MOB verbalized understanding. MORGAN also has WIC benefits and understands she will need to schedule phone appt. Care Management Interventions  PCP Verified by CM: Yes(Dr. Sarah Ware)  Mode of Transport at Discharge:  Other (see comment)  Transition of Care Consult (CM Consult): Discharge Planning  Current Support Network: Lives with Spouse, Family Lives [place], Own Home  Confirm Follow Up Transport: Family  Discharge Location  Discharge Placement: Home with family assistance  Maximilianosatish Nasim

## 2020-04-14 NOTE — PROGRESS NOTES
SBAR IN Report: Mother    Bedside and verbal report received from C. Edwinna Lesches, RN (full name & credentials) on this patient, who is now being transferred from L&D (unit) for routine progression of care. Report consisted of patient's Situation, Background, Assessment and Recommendations (SBAR). [place] ID bands were compared with the identification form, and verified with the patient and transferring nurse. Information from the SBAR, Kardex, Intake/Output and MAR and the [place] Report was reviewed with the transferring nurse; opportunity for questions and clarification provided. Pradeep White RN assumed care of patient.

## 2020-04-14 NOTE — PROGRESS NOTES
0715-Bedside report received from Jensen Reeves RN.  Jose Alejandro Morin on unit, writer spoke with MD about POC. MD states she does not want to start pitocin on pt. MD plans on AROM and would like pt to get epidural as soon as she can since pt wishes to have one. MD going to round then will be in to see pt. No other orders at this time. 1034-Júnior Limon called, MD updated on pt status that pt has an epidural, infant reactive, contractions every 4-8 minutes, not fluid noted since AROM. No new orders at this time. Dr. Margo Garduno or Dr. Kandi Grover will come perform SVE, see if pt needs to be AROM'd again, and place internal monitors. 1848-Pt assisted up to bathroom, voided 925, usman care performed, pt assisted back to bed. Pt request motrin at this time for cramping. 1910-TRANSFER - OUT REPORT:    Verbal report given to ELENA Ross RN(name) on Paige Patrick  being transferred to MIU(unit) for routine progression of care       Report consisted of patients Situation, Background, Assessment and   Recommendations(SBAR). Information from the following report(s) SBAR, Kardex, Procedure Summary, Intake/Output, MAR, Recent Results and Med Rec Status was reviewed with the receiving nurse. Lines:   Peripheral IV 04/14/20 Left Hand (Active)   Site Assessment Clean, dry, & intact 4/14/2020  9:13 AM   Phlebitis Assessment 0 4/14/2020  9:13 AM   Infiltration Assessment 0 4/14/2020  9:13 AM   Dressing Status Clean, dry, & intact 4/14/2020  9:13 AM        Opportunity for questions and clarification was provided.       Patient transported with:   Registered Nurse

## 2020-04-14 NOTE — L&D DELIVERY NOTE
Delivery Summary    Patient: Harvinder May MRN: [id number]  SSN: [id number]    YOB: 1985  Age: 29 y.o. Sex: female       Information for the patient's :  Yolis Patrick [[id number]]       Labor Events:    Labor: No    Steroids: None   Cervical Ripening Date/Time:       Cervical Ripening Type: None   Antibiotics During Labor: No   Rupture Identifier:      Rupture Date/Time: 2020 8:37 AM   Rupture Type: AROM   Amniotic Fluid Volume: Moderate    Amniotic Fluid Description: Meconium    Amniotic Fluid Odor: None    Induction: AROM; Oxytocin       Induction Date/Time: 2020 8:37 AM    Indications for Induction: Post-term Gestation    Augmentation:     Augmentation Date/Time:      Indications for Augmentation:     Labor complications: None       Additional complications:        Delivery Events:  Indications For Episiotomy:     Episiotomy: None   Perineal Laceration(s): 2nd   Repaired:     Periurethral Laceration Location:      Repaired:     Labial Laceration Location:     Repaired:     Sulcal Laceration Location:     Repaired:     Vaginal Laceration Location:     Repaired:     Cervical Laceration Location:     Repaired:     Repair Suture: Chromic 3-0;Vicryl 3-0   Number of Repair Packets: 2   Estimated Blood Loss (ml):  ml   Quantitative Blood Loss (ml)                Delivery Date: 2020    Delivery Time: 4:04 PM  Delivery Type: , Spontaneous  Sex:  Female    Gestational Age: 37w0d   Delivery Clinician:  Edgar Cornejo  Living Status: Living   Delivery Location: L&D            APGARS  One minute Five minutes Ten minutes   Skin color: 1   1        Heart rate: 2   2        Grimace: 2   2        Muscle tone: 2   2        Breathin   2        Totals: 9   9            Presentation: Vertex    Position: Left Occiput Anterior  Resuscitation Method:  Tactile Stimulation     Meconium Stained:  Thin      Cord Information: 3 Vessels  Complications: None  Cord around: Delayed cord clamping? Yes  Cord clamped date/time:2020  4:05 PM  Disposition of Cord Blood: Lab    Blood Gases Sent?: No    Placenta:  Date/Time: 2020  4:18 PM  Removal: Manual Removal      Appearance: Adherent     [place] Measurements:  Birth Weight:        Birth Length:        Head Circumference:        Chest Circumference:       Abdominal Girth: Other Providers:   PAUL VALLADARES;POORNIMA HENNING;;ROBERTH JETT;CHAYA RAGSDALE, Obstetrician;Primary Nurse;Primary [place] Nurse;Primary Nurse;Tech       placenta adherent to anterior uterus. Manual extraction with cotyledons noted. Bimanual exam with betadine prepped hand and adhered membrane removed. Uterus clamped down, no bleeding, gritty throughout after. Group B Strep:   Lab Results   Component Value Date/Time    GrBStrep, External Negative 2020     Information for the patient's :  [place], Female Smith Rios [[id number]]   No results found for: ABORH, PCTABR, PCTDIG, BILI, ABORHEXT, ABORH    No results for input(s): PCO2CB, PO2CB, HCO3I, SO2I, IBD, PTEMPI, SPECTI, PHICB, ISITE, IDEV, IALLEN in the last 72 hours.

## 2020-04-14 NOTE — DISCHARGE SUMMARY
Patient ID:  Marsha Antonio  [id number]  67 y.o.  1985    Admit Date: 2020    Discharge Date: April 15    Admitting Physician: Jackie Mansfield DO    Attending Physician: Jeramie Redmond DO    Admission Diagnoses: Pregnant [Z34.90]    Procedures for this admission:     Discharge Diagnoses: Same as above with  producing a viable infant. Information for the patient's :  Yolis Patrick  [[id number]]       apgars 9 and 9  2nd degree lac  Adherent placenta, manual extraction     Discharge Disposition:  good    Discharge Condition:  good    Additional Diagnoses: IUP 41wks IOL. Maternal Labs:   Lab Results   Component Value Date/Time    HBsAg, External Negative 2019    HIV, External Negative 2019    Rubella, External Immune 2019    RPR, External Non Reactive 2019    GrBStrep, External Negative 2020       Cord Blood Results:   Information for the patient's :  Yolis Patrick  [[id number]]   No results found for: PCTABR, PCTDIG, BILI, ABORH          History of Present Illness:   OB History        4    Para   2    Term   1       1    AB   1    Living   3       SAB   1    TAB        Ectopic        Molar        Multiple   1    Live Births   3              Admitted for induction of labor. Hospital Course:   Patient was admitted as above and delivered via  . Please the chart for details. The postpartum course was unremarkable. She was deemed stable for discharge home on day 1. Follow-up Care: 4-6wks.         Signed:  Rocio Yan MD  2020  4:29 PM

## 2020-04-14 NOTE — PROGRESS NOTES
Labor Progress Note  Patient seen, fetal heart rate and contraction pattern evaluated, patient examined. Comfortable w/ epidural  Patient Vitals for the past 2 hrs:   BP Pulse SpO2   04/14/20 1318 -- -- 94 %   04/14/20 1313 -- -- 97 %   04/14/20 1312 (!) 84/40 (!) 109 --   04/14/20 1310 (!) 75/38 (!) 105 --   04/14/20 1309 (!) 79/49 92 --   04/14/20 1308 -- -- 97 %   04/14/20 1303 -- -- 97 %   04/14/20 1258 -- -- 98 %   04/14/20 1253 -- -- 98 %   04/14/20 1252 103/54 (!) 109 --   04/14/20 1248 -- -- 97 %   04/14/20 1243 -- -- 98 %   04/14/20 1238 -- -- 97 %   04/14/20 1237 92/54 (!) 110 --   04/14/20 1233 -- -- 99 %   04/14/20 1228 -- -- 99 %   04/14/20 1223 -- -- 98 %   04/14/20 1222 90/55 (!) 110 --   04/14/20 1218 -- -- 98 %   04/14/20 1213 -- -- 99 %   04/14/20 1208 112/65 (!) 107 98 %   04/14/20 1203 -- -- 99 %   04/14/20 1158 -- -- 97 %   04/14/20 1153 104/56 (!) 108 99 %   04/14/20 1148 -- -- 96 %   04/14/20 1146 -- -- 94 %   04/14/20 1143 -- -- 98 %   04/14/20 1138 -- -- 92 %   04/14/20 1137 98/61 (!) 102 --   04/14/20 1133 -- -- 97 %   04/14/20 1128 -- -- 98 %       Physical Exam:  Cervical Exam:  def  Uterine Activity: Frequency: Q3-5  currently  Fetal Heart Rate: Reactive  Baseline: 140 per minute  Variability: moderate  Accelerations: yes  Decelerations: occ early, occ late  Pit running    Assessment/Plan:  IUP 41wks IOL TOLAC   FWB reassuring  Cont pit IOL, ques answered.  Pt and family understand and agree    Theresa Rosado MD

## 2020-04-14 NOTE — PROGRESS NOTES
1113  Dr. Alexandra Dwyer in to view strip and SVE. 1115  IUPC placed after zeroed. 1135  Patient repositioned to right side. Dr. Michael Pino ordered Pitocin augmentation. 1310  B/P noted to be low. 1311  B/P repeated, patient denies dizziness or nausea. B/P in upper arm while in left lateral position. 1438  Variable decel down to 105-120 noted. Decel lasting 80 seconds. Patient having some lower abdominal pain with uc's. Instructed to use PCA button if needed. 26  Dr. Ani Vargas made aware patient is ready for delivery. 1600  Dr. Ani Vargas at bedside. [id number]  Live female infant, see delivery summary.

## 2020-04-14 NOTE — ROUTINE PROCESS
7658  Patient arrives to L & D, for a scheduled C section. Patient has been oriented to room and unit. EFM and TOCO placed. Consent forms signed. Peripheral IV started. 0715  Bedside and Verbal shift change report given to JOHAN Contreras RN (oncoming nurse) by Kathryn Iglesias RN (offgoing nurse). Report included the following information SBAR, Kardex, MAR, Accordion and Recent Results.

## 2020-04-14 NOTE — PROGRESS NOTES
Labor Progress Note  Patient seen, fetal heart rate and contraction pattern evaluated at 1108    Physical Exam:  Cervical Exam was examined   5 cm dilated    60% effaced    -2 station    Presenting Part: cephalic  Cervical Position: posterior  Consistency: Medium    Membranes:  Blood stained  Uterine Activity[de-identified] Intensity: mild  Fetal Heart Rate: Reactive    Assessment/Plan:    Reassuring fetal status, Continue plan for vaginal delivery, IUPC and IFM placed. Reassuring fetal status. Continue current managent.   Tavares Perkins MD  4/14/2020  11:19 AM

## 2020-04-14 NOTE — ANESTHESIA PREPROCEDURE EVALUATION
Anesthetic History   No history of anesthetic complications            Review of Systems / Medical History  Patient summary reviewed and pertinent labs reviewed    Pulmonary  Within defined limits                 Neuro/Psych   Within defined limits           Cardiovascular  Within defined limits                     GI/Hepatic/Renal  Within defined limits              Endo/Other      Hypothyroidism       Other Findings   Comments: Previous C section, TOLAC           Physical Exam    Airway  Mallampati: II           Cardiovascular               Dental         Pulmonary                 Abdominal         Other Findings            Anesthetic Plan    ASA: 2  Anesthesia type: epidural            Anesthetic plan and risks discussed with: Patient

## 2020-04-15 VITALS
RESPIRATION RATE: 16 BRPM | BODY MASS INDEX: 30.76 KG/M2 | HEIGHT: 67 IN | WEIGHT: 196 LBS | OXYGEN SATURATION: 98 % | HEART RATE: 97 BPM | SYSTOLIC BLOOD PRESSURE: 107 MMHG | TEMPERATURE: 97.9 F | DIASTOLIC BLOOD PRESSURE: 59 MMHG

## 2020-04-15 PROCEDURE — 74011250637 HC RX REV CODE- 250/637: Performed by: OBSTETRICS & GYNECOLOGY

## 2020-04-15 PROCEDURE — 77030021125

## 2020-04-15 RX ORDER — IBUPROFEN 800 MG/1
800 TABLET ORAL
Qty: 40 TAB | Refills: 1 | Status: SHIPPED | OUTPATIENT
Start: 2020-04-15

## 2020-04-15 RX ORDER — DOCUSATE SODIUM 100 MG/1
100 CAPSULE, LIQUID FILLED ORAL
Qty: 60 CAP | Refills: 3 | Status: SHIPPED | OUTPATIENT
Start: 2020-04-15 | End: 2020-07-14

## 2020-04-15 RX ADMIN — LEVOTHYROXINE SODIUM 50 MCG: 0.05 TABLET ORAL at 07:20

## 2020-04-15 RX ADMIN — Medication 10 ML: at 03:19

## 2020-04-15 RX ADMIN — IBUPROFEN 800 MG: 800 TABLET ORAL at 03:15

## 2020-04-15 RX ADMIN — DOCUSATE SODIUM 100 MG: 100 CAPSULE, LIQUID FILLED ORAL at 11:13

## 2020-04-15 RX ADMIN — IBUPROFEN 800 MG: 800 TABLET ORAL at 11:13

## 2020-04-15 NOTE — PROGRESS NOTES
Bedside and Verbal shift change report given to Alfred Oliver RN (oncoming nurse) by Analilia Woodward RN (offgoing nurse). Report included the following information SBAR, Kardex, Intake/Output, MAR, Accordion, Recent Results, Med Rec Status and Cardiac Rhythm NSR, Tachy.

## 2020-04-15 NOTE — PROGRESS NOTES
Post-Partum Day Number 1 Progress Note    Patient doing well post-partum without significant complaints. Voiding without difficulty, normal lochia.     Vitals:    Patient Vitals for the past 24 hrs:   BP Temp Pulse Resp SpO2   04/15/20 0745 104/63 98.8 °F (37.1 °C) 89 16 --   04/14/20 2315 116/69 98 °F (36.7 °C) 97 16 --   04/14/20 1937 -- -- (!) 106 -- --   04/14/20 1920 111/55 98.3 °F (36.8 °C) (!) 106 16 --   04/14/20 1910 111/55 98.3 °F (36.8 °C) (!) 121 16 --   04/14/20 1738 -- -- -- -- 98 %   04/14/20 1737 93/69 -- (!) 110 -- --   04/14/20 1733 -- -- -- -- 98 %   04/14/20 1728 -- -- -- -- 98 %   04/14/20 1723 -- -- -- -- 98 %   04/14/20 1722 111/66 -- (!) 109 -- --   04/14/20 1718 -- -- -- -- 99 %   04/14/20 1713 -- -- -- -- 98 %   04/14/20 1708 -- -- -- -- 98 %   04/14/20 1707 115/74 -- (!) 116 -- --   04/14/20 1703 -- -- -- -- 98 %   04/14/20 1658 -- -- -- -- 98 %   04/14/20 1653 -- -- -- -- 98 %   04/14/20 1652 123/64 -- (!) 118 -- --   04/14/20 1648 -- -- -- -- 98 %   04/14/20 1643 -- -- -- -- 98 %   04/14/20 1638 -- -- -- -- 98 %   04/14/20 1637 103/76 -- (!) 126 -- --   04/14/20 1632 -- -- -- -- 99 %   04/14/20 1627 -- -- -- -- 98 %   04/14/20 1622 109/61 98.6 °F (37 °C) (!) 115 16 99 %   04/14/20 1617 -- -- -- -- 99 %   04/14/20 1612 -- -- -- -- 98 %   04/14/20 1607 -- -- -- -- 99 %   04/14/20 1602 -- -- -- -- 99 %   04/14/20 1557 -- -- -- -- 100 %   04/14/20 1556 -- 98.4 °F (36.9 °C) (!) 111 18 99 %   04/14/20 1552 105/72 -- (!) 109 -- 100 %   04/14/20 1547 -- -- -- -- 100 %   04/14/20 1542 -- -- -- -- 100 %   04/14/20 1538 116/67 -- (!) 105 -- --   04/14/20 1537 -- -- -- -- 99 %   04/14/20 1532 -- -- -- -- 99 %   04/14/20 1527 -- -- -- -- 99 %   04/14/20 1522 119/77 -- (!) 107 -- 99 %   04/14/20 1517 -- -- -- -- 98 %   04/14/20 1512 -- -- -- -- 98 %   04/14/20 1509 115/67 98.8 °F (37.1 °C) 98 18 --   04/14/20 1507 -- -- -- -- 98 %   04/14/20 1502 -- -- -- -- 99 %   04/14/20 1457 -- -- -- -- 99 % 04/14/20 1453 (!) 81/55 -- 96 -- --   04/14/20 1452 -- -- -- -- 99 %   04/14/20 1447 -- -- -- -- 98 %   04/14/20 1442 -- -- -- -- 98 %   04/14/20 1437 (!) 82/46 -- 94 -- 97 %   04/14/20 1432 -- -- -- -- 99 %   04/14/20 1427 -- -- -- -- 97 %   04/14/20 1423 (!) 86/50 -- 94 -- --   04/14/20 1422 -- -- -- -- 97 %   04/14/20 1417 -- -- -- -- 97 %   04/14/20 1412 -- -- -- -- 94 %   04/14/20 1408 101/58 -- (!) 101 -- --   04/14/20 1407 -- -- -- -- 95 %   04/14/20 1402 -- -- -- -- 95 %   04/14/20 1401 -- -- -- -- 94 %   04/14/20 1357 -- -- -- -- 97 %   04/14/20 1354 -- 98.8 °F (37.1 °C) (!) 102 16 97 %   04/14/20 1352 98/61 -- (!) 106 -- 96 %   04/14/20 1337 96/60 -- -- -- --   04/14/20 1318 -- -- -- -- 94 %   04/14/20 1313 -- -- -- -- 97 %   04/14/20 1312 (!) 84/40 98.7 °F (37.1 °C) (!) 109 16 --   04/14/20 1310 (!) 75/38 -- (!) 105 -- --   04/14/20 1309 (!) 79/49 -- 92 -- --   04/14/20 1308 -- -- -- -- 97 %   04/14/20 1303 -- -- -- -- 97 %   04/14/20 1258 -- -- -- -- 98 %   04/14/20 1253 -- -- -- -- 98 %   04/14/20 1252 103/54 -- (!) 109 -- --   04/14/20 1248 -- -- -- -- 97 %   04/14/20 1243 -- -- -- -- 98 %   04/14/20 1238 -- -- -- -- 97 %   04/14/20 1237 92/54 -- (!) 110 -- --   04/14/20 1233 -- -- -- -- 99 %   04/14/20 1228 -- -- -- -- 99 %   04/14/20 1223 -- -- -- -- 98 %   04/14/20 1222 90/55 -- (!) 110 -- --   04/14/20 1218 -- -- -- -- 98 %   04/14/20 1213 -- -- -- -- 99 %   04/14/20 1208 112/65 -- (!) 107 -- 98 %   04/14/20 1203 -- -- -- -- 99 %   04/14/20 1158 -- -- -- -- 97 %   04/14/20 1153 104/56 -- (!) 108 -- 99 %   04/14/20 1148 -- -- -- -- 96 %   04/14/20 1146 -- -- -- -- 94 %   04/14/20 1143 -- -- -- -- 98 %   04/14/20 1138 -- -- -- -- 92 %   04/14/20 1137 98/61 -- (!) 102 -- --   04/14/20 1133 -- -- -- -- 97 %   04/14/20 1128 -- -- -- -- 98 %   04/14/20 1123 -- -- -- -- 99 %   04/14/20 1122 94/46 -- 88 -- --   04/14/20 1118 -- -- -- -- 99 %   04/14/20 1113 -- -- -- -- 99 %   04/14/20 1108 -- -- -- -- 97 %   20 1107 96/58 -- 96 -- --   20 1103 -- -- -- -- 96 %   20 1021 95/49 -- 98 -- --   20 1018 -- -- -- -- 99 %   20 1013 -- -- -- -- 98 %   20 1011 (!) 83/45 -- 94 -- --   20 0941 98/56 -- (!) 109 -- --   20 0922 98/57 -- (!) 116 -- --   20 0919 103/50 -- (!) 112 -- --   20 0918 -- -- -- -- 99 %   20 0916 98/53 -- (!) 105 -- --   20 0913 99/58 98.2 °F (36.8 °C) 97 16 98 %   20 0910 100/67 -- 98 -- --     Temp (24hrs), Av.5 °F (36.9 °C), Min:98 °F (36.7 °C), Max:98.8 °F (37.1 °C)      Vital signs stable, afebrile. Exam:  Patient without distress. Abdomen soft, fundus firm, nontender               Lower extremities, KEVIN nontender w/o edema    Labs: No results found for this or any previous visit (from the past 24 hour(s)). Assessment and Plan:  PPD 1, stable, routine care  Early discharge requested. Discharge today-instructions reviewed. Follow up 6 weeks pp.     Alie Joe DO, [address] [facility],[address] [facility]

## 2020-04-15 NOTE — PROGRESS NOTES
Pt off unit in stable condition via wheelchair with volunteers for discharge home per Dr. Nancy Rao. Pt is aware to follow-up in 6 weeks. Prescriptions given to pt. Pt denies any HA, dizziness, N/V or pain at this time. Infant in car seat and discharged with mother.

## 2020-04-15 NOTE — DISCHARGE INSTRUCTIONS
Discharge Instructions for Vaginal Delivery    Patient ID:  Sarah Redman  [id number]  29 y.o.  1985    Take Home Medications   Continue taking your prenatal vitamins if you are breastfeeding. Follow-up care is a key part of your treatment and safety. Please schedule and keep appointments. Follow-up with your primary OB in 6 weeks. Activity  Avoid anything in your vagina for 6 weeks (no intercourse, tampons, or douching). You may drive unless you are taking prescription pain medications. Climbing stairs and light lifting are okay. Please avoid excessive exercise, though walking is okay- you'll be tired! Diet  Regular diet as tolerated. Be sure to drink plenty of fluids if you are breastfeeding. Wound care  If you have stitches, continue to rinse with a squirt bottle of warm water each time you void for about 7-10 days. .  Your stitches will gradually dissolve over four to eight weeks. Sitz baths are also helpful to keep the wound clean, encourage healing, and to help with pain associated with the stitches or hemorrhoids. You can use either a sitz bath basin or a bathtub filled with 2-3\" inches of plain warm water. Soak for 10 minutes 3 times a day as tolerated. Pain Management  1. Over the counter medications such as Tylenol and ibuprofen (Motrin or Advil) are ideal.  These may be taken together, alternating doses. You may  take the maximum dose:  Motrin or Advil (generic ibuprofen), either 3 tablets every 6 hours or 4 tablets every 8 hours or Tylenol (acetominophen) 1000mg every 6 hours (equivalent to 2 extra strength Tylenol). 2. You may also have a precrescription for stronger pain medication. Take only as needed and transition to over the counter medication in the next few days. Minimize amounts of the prescription medication, as it can be habit-forming and will worsen or cause constipation.  Most patients will find that within a couple of days, their pain is adequately controlled using only over-the-counter medications. 3. The prescription pain medication is mixed with Tylenol, therefore, you should not take any extra Tylenol or acetaminophen until you have reduced your prescription pain medication. 4. Add heating pad or sitz baths as needed. Add hemorrhoid wipes or ointments if needed    Constipation  1. Constipation is normal after pregnancy and delivery, especially while taking prescription narcotic pain medication. 2. Over the counter remedies including ducosate (Colace), take 1-2 capsules 1-2 times daily for soft stool as needed. You may also add/ try milk of magnesia or rectal remedies such as Dulcolax or Fleets enema. Recovery: What to Expect at Home  1. Fatigue is expected. Try to rest when you can and don't worry about doing housework or other tasks which can wait. 2. The soreness along your bottom will improve significantly over the first 2 weeks, but it may take 6 weeks before you are completely recovered. 3. Back pain or general body aches or muscle soreness are expected and should improve with acetominophen or ibuprofen. 4. Leg swelling due to pregnancy and/or IV fluids given in the hospital will take about two weeks to resolve. 5. Most women experience some form of the \"Baby Blues\" after having a baby. Feeling emotional, tearful, frustrated, anxious, sad, and irritable some of the time is normal and go away after about 2 weeks. Adequate rest and help from your family will help. Take breaks from caring for the baby. Call your doctor if your symptoms seem severe, last more than 2 weeks, or seem to be getting worse instead of better. Get help immediately if you have thoughts of wanting to hurt yourself or others! Call your doctor or seek immediate medical care if you have:  Heavy vaginal bleeding, soaking through one or more pads an hour for several hours. Foul-smelling discharge from your vagina or incision.   Consistent nausea and vomiting and cannot keep fluids down. Consistent pain that does not get better after you take pain medicine. Sudden chest pain and shortness of breath  Signs of a blood clot: pain/ swelling/ increasing redness in your lower extremeties  Signs of infection: increased pain in your abdomen or vaginal area; red streaks, warmth, or tenderness of your breasts; fever of 100.5 F or greater    POSTPARTUM DISCHARGE INSTRUCTIONS       Name:  Dustin Phillips  YOB: 1985  Admission Diagnosis:  Pregnant [Z34.90]     Discharge Diagnosis:    Problem List as of 4/15/2020 Never Reviewed          Codes Class Noted - Resolved    Pregnant ICD-10-CM: Z34.90  ICD-9-CM: V22.2  2020 - Present         labor in second trimester ICD-10-CM: O60.02  ICD-9-CM: 644.20  3/31/2019 - Present        Pregnancy ICD-10-CM: Z34.90  ICD-9-CM: V22.2  2017 - Present            Attending Physician:  Bharti Bowden, DO    Delivery Type:  Vaginal Childbirth with Episiotomy, Laceration or Tear: What To Expect At [address] [place] [address] body will slowly heal in the next few weeks. It is easy to get too tired and overwhelmed during the first weeks after your baby is born. Changes in your hormones can shift your mood without warning. You may find it hard to meet the extra demands on your energy and time. Take it easy on yourself. Follow-up care is a key part of your treatment and safety. Be sure to make and go to all appointments, and call your doctor if you are having problems. It's also a good idea to know your test results and keep a list of the medicines you take. How can you care for yourself at home? Vaginal Bleeding and Cramps  · After delivery, you will have a bloody discharge from the vagina. This will turn pink within a week and then white or yellow after about 10 days. It may last for 2 to 4 weeks or longer, until the uterus has healed. Use pads instead of tampons until you stop bleeding.   · Do not worry if you pass some blood clots, as long as they are smaller than a golf ball. If you have a tear or stitches in your vaginal area, change the pad at least every 4 hours to prevent soreness and infection. · You may have cramps for the first few days after childbirth. These are normal and occur as the uterus shrinks to normal size. Take an over-the-counter pain medicine, such as acetaminophen (Tylenol), ibuprofen (Advil, Motrin), or naproxen (Aleve), for cramps. Read and follow all instructions on the label. Do not take aspirin, because it can cause more bleeding. Do not take acetaminophen (Tylenol) and other acetaminophen containing medications (i.e. Percocet) at the same time. Episiotomy, Lacerations or Tears  · If you have stitches, they will dissolve on their own and do not need to be removed. · Put ice or a cold pack on your painful area for 10 to 20 minutes at a time, several times a day, for the first few days. Put a thin cloth between the ice and your skin. · Sit in a few inches of warm water (sitz bath) 3 times a day and after bowel movements. The warm water helps with pain and itching. If you do not have a tub, a warm shower might help. Breast fullness  · Your breasts may overfill (engorge) in the first few days after delivery. To help milk flow and to relieve pain, warm your breasts in the shower or by using warm, moist towels before nursing. · If you are not nursing, do not put warmth on your breasts or touch your breasts. Wear a tight bra or sports bra and use ice until the fullness goes away. This usually takes 2 to 3 days. · Put ice or a cold pack on your breast after nursing to reduce swelling and pain. Put a thin cloth between the ice and your skin. Activity  · Eat a balanced diet. Do not try to lose weight by cutting calories. Keep taking your prenatal vitamins, or take a multivitamin. · Get as much rest as you can. Try to take naps when your baby sleeps during the day.   · Get some exercise every day. But do not do any heavy exercise until your doctor says it is okay. · Wait until you are healed (about 4 to 6 weeks) before you have sexual intercourse. Your doctor will tell you when it is okay to have sex. · Talk to your doctor about birth control. You can get pregnant even before your period returns. Also, you can get pregnant while you are breast-feeding. Mental Health  · Many women get the \"baby blues\" during the first few days after childbirth. You may lose sleep, feel irritable, and cry easily. You may feel happy one minute and sad the next. Hormone changes are one cause of these emotional changes. Also, the demands of a new baby, along with visits from relatives or other family needs, add to a mother's stress. The \"baby blues\" often peak around the fourth day. Then they ease up in less than 2 weeks. · If your moodiness or anxiety lasts for more than 2 weeks, or if you feel like life is not worth living, you may have postpartum depression. This is different for each mother. Some mothers with serious depression may worry intensely about their infant's well-being. Others may feel distant from their child. Some mothers might even feel that they might harm their baby. A mother may have signs of paranoia, wondering if someone is watching her. · With all the changes in your life, you may not know if you are depressed. Pregnancy sometimes causes changes in how you feel that are similar to the symptoms of depression. · Symptoms of depression include:  · Feeling sad or hopeless and losing interest in daily activities. These are the most common symptoms of depression. · Sleeping too much or not enough. · Feeling tired. You may feel as if you have no energy. · Eating too much or too little. · [employer] (PSI) offers a Warm line; Chat with the Expert phone sessions; Information and Articles about Pregnancy and Postpartum Mood Disorders;  Comprehensive List of Free Support Groups; Knowledgeable local coordinators who will offer support, information, and resources; Guide to Resources on Sellywhere; Calendar of events in the  mood disorders community; Latest News and Research; and [facility] [address] for [employer]. Remember - You are not alone; You are not to blame; With help, you will be well. 2-196-754-PPD(3773). WWW. POSTPARTUM. NET    · Writing or talking about death, such as writing suicide notes or talking about guns, knives, or pills. Keep the numbers for these national suicide hotlines: 3-552-818-TALK (4-726.661.1552) and 9-388-PKACOTV (0-968.125.9454). If you or someone you know talks about suicide or feeling hopeless, get help right away. Constipation and Hemorrhoids  Drink plenty of fluids, enough so that your urine is light yellow or clear like water. If you have kidney, heart, or liver disease and have to limit fluids, talk with your doctor before you increase the amount of fluids you drink. · Eat plenty of fiber each day. Have a bran muffin or bran cereal for breakfast, and try eating a piece of fruit for a mid-afternoon snack. · For painful, itchy hemorrhoids, put ice or a cold pack on the area several times a day for 10 minutes at a time. Follow this by putting a warm compress on the area for another 10 to 20 minutes or by sitting in a shallow, warm bath. When should you call for help? Call 911 anytime you think you may need emergency care. For example, call if:  · You are thinking of hurting yourself, your baby, or anyone else. · You passed out (lost consciousness). · You have symptoms of a blood clot in your lung (called a pulmonary embolism). These may include:  · Sudden chest pain. · Trouble breathing. · Coughing up blood. Call your doctor now or seek immediate medical care if:  · You have severe vaginal bleeding. · You are soaking through a pad each hour for 2 or more hours.   · Your vaginal bleeding seems to be getting heavier or is still bright red 4 days after delivery. · You are dizzy or lightheaded, or you feel like you may faint. · You are vomiting or cannot keep fluids down. · You have a fever. · You have new or more belly pain. · You pass tissue (not just blood). · Your vaginal discharge smells bad. · Your belly feels tender or full and hard. · Your breasts are continuously painful or red. · You feel sad, anxious, or hopeless for more than a few days. · You have sudden, severe pain in your belly. · You have symptoms of a blood clot in your leg (called a deep vein thrombosis), such as:  · Pain in your calf, back of the knee, thigh, or groin. · Redness and swelling in your leg or groin. · You have symptoms of preeclampsia, such as:  · Sudden swelling of your face, hands, or feet. · New vision problems (such as dimness or blurring). · A severe headache. · Your blood pressure is higher than it should be or rises suddenly. · You have new nausea or vomiting. Watch closely for changes in your health, and be sure to contact your doctor if you have any problems. Additional Information:  Preventing Infection at Home    We care about preventing infection and avoiding the spread of germs - not only when you are in the hospital but also when you return home. When you return home from the hospital, it's important to take the following steps to help prevent infection and avoid spreading germs that could infect you and others. Ask everyone in your home to follow these guidelines, too. Clean Your Hands  · Clean your hands whenever your hands are visibly dirty, before you eat, before or after touching your mouth, nose or eyes, and before preparing food. Clean them after contact with body fluids, using the restroom, touching animals or changing diapers. · When washing hands, wet them with warm water and work up a lather. Rub hands for at least 15 seconds, then rinse them and pat them dry with a clean towel or paper towel.   · When using hand sanitizers, it should take about 15 seconds to rub your hands dry. If not, you probably didn't apply enough . Cover Your Sneeze or Cough  Germs are released into the air whenever you sneeze or cough. To prevent the spread of infection:  · Turn away from other people before coughing or sneezing. · Cover your mouth or nose with a tissue when you cough or sneeze. Put the tissue in the trash. · If you don't have a tissue, cough or sneeze into your upper sleeve, not your hands. · Always clean your hands after coughing or sneezing. Care for Wounds  Your skin is your body's first line of defense against germs, but an open wound leaves an easy way for germs to enter your body. To prevent infection:  · Clean your hands before and after changing wound dressings, and wear gloves to change dressings if recommended by your doctor. · Take special care with IV lines or other devices inserted into the body. If you must touch them, clean your hands first.  · Follow any specific instructions from your doctor to care for your wounds. Contact your doctor if you experience any signs of infection, such as fever or increased redness at the surgical or wound site. Keep a Clean Home  · Clean or wipe commonly touched hard surfaces like door handles, sinks, tabletops, phones and TV remotes. · Use products labeled \"disinfectant\" to kill harmful bacteria and viruses. · Use a clean cloth or paper towel to clean and dry surfaces. Wiping surfaces with a dirty dishcloth, sponge or towel will only spread germs. · Never share toothbrushes, mayo, drinking glasses, utensils, razor blades, face cloths or bath towels to avoid spreading germs. · Be sure that the linens that you sleep on are clean. · Keep pets away from wounds and wash your hands after touching pets, their toys or bedding. We care about you and your health.  Remember, preventing infections is a   team effort between you, your family, friends and health care providers. These are general instructions for a healthy lifestyle:    No smoking/ No tobacco products/ Avoid exposure to second hand smoke    Surgeon General's Warning:  Quitting smoking now greatly reduces serious risk to your health. Obesity, smoking, and sedentary lifestyle greatly increases your risk for illness    A healthy diet, regular physical exercise & weight monitoring are important for maintaining a healthy lifestyle    Recognize signs and symptoms of STROKE:    F-face looks uneven    A-arms unable to move or move unevenly    S-speech slurred or non-existent    T-time-call 911 as soon as signs and symptoms begin - DO NOT go       back to bed or wait to see if you get better - TIME IS BRAIN. I have had the opportunity to make my options or choices for discharge. I have received and understand these instructions.

## 2020-04-15 NOTE — PROGRESS NOTES
Patient up ad-bernard and voiding.  Education provided on call bell, warning signs, feeding infant, infant safety

## 2020-04-15 NOTE — LACTATION NOTE
This note was copied from a baby's chart. Mother and baby for possible discharge. [facility] spoke with mother - she denies need for breastfeeding assistance and breastfeeding education at this time. Instructed mother to call [facility] if she changes her mind. Mother has LC#.

## 2020-04-15 NOTE — LACTATION NOTE
This note was copied from a baby's chart. Experienced breast feeding mother. Discussed with mother her plan for feeding. Reviewed the benefits of exclusive breast milk feeding during the hospital stay. Informed her of the risks of using formula to supplement in the first few days of life as well as the benefits of successful breast milk feeding; referred her to the Breastfeeding booklet about this information. She acknowledges understanding of information reviewed and states that it is her plan to breastfeed her infant. Will support her choice and offer additional information as needed. Encouraged mom to attempt feeding with baby led feeding cues. Just as sucking on fingers, rooting, mouthing. Looking for 8-12 feedings in 24 hours. Don't limit baby at breast, allow baby to come of breast on it's own. Baby may want to feed  often and may increase number of feedings on second day of life. Skin to skin encouraged. If baby doesn't nurse,  Mom should  hand express  10-20 drops of colostrum and drip into baby's mouth, or give to baby by finger feeding, cup feeding, or spoon feeding at least every 2-3 hours. Mother will successfully establish breastfeeding by feeding in response to early feeding cues   or wake every 3h, will obtain deep latch, and will keep log of feedings/output. Taught to BF at hunger cues and or q 2-3 hrs and to offer 10-20 drops of hand expressed colostrum at any non-feeds.          Breast- Feeding Assessment  Attends Breast-Feeding Classes: No  Breast-Feeding Experience: Yes  Breast Trauma/Surgery: No  Type/Quality: Good(Per mother)  Lactation Consultant Visits  Breast-Feedings: (Mother last breast fed baby at 26 for 15 minutes)       Mother given breastfeeding handouts and LC#

## 2022-03-18 PROBLEM — Z34.90 PREGNANCY: Status: ACTIVE | Noted: 2017-05-22

## 2022-03-19 PROBLEM — Z34.90 PREGNANT: Status: ACTIVE | Noted: 2020-04-14

## 2022-03-20 PROBLEM — O60.02 PRETERM LABOR IN SECOND TRIMESTER: Status: ACTIVE | Noted: 2019-03-31

## 2022-05-04 LAB
CHLAMYDIA, EXTERNAL: NEGATIVE
N. GONORRHEA, EXTERNAL: NEGATIVE

## 2022-05-23 LAB
ANTIBODY SCREEN, EXTERNAL: NEGATIVE
HBSAG, EXTERNAL: NEGATIVE
HIV, EXTERNAL: NEGATIVE
RPR, EXTERNAL: NORMAL
RUBELLA, EXTERNAL: NORMAL
TYPE, ABO & RH, EXTERNAL: NORMAL

## 2022-11-14 LAB — GRBS, EXTERNAL: NEGATIVE

## 2022-11-25 ENCOUNTER — ANESTHESIA EVENT (OUTPATIENT)
Dept: LABOR AND DELIVERY | Age: 37
DRG: 560 | End: 2022-11-25
Payer: COMMERCIAL

## 2022-11-25 ENCOUNTER — ANESTHESIA (OUTPATIENT)
Dept: LABOR AND DELIVERY | Age: 37
DRG: 560 | End: 2022-11-25
Payer: COMMERCIAL

## 2022-11-25 ENCOUNTER — HOSPITAL ENCOUNTER (INPATIENT)
Age: 37
LOS: 2 days | Discharge: HOME | DRG: 560 | End: 2022-11-27
Attending: STUDENT IN AN ORGANIZED HEALTH CARE EDUCATION/TRAINING PROGRAM | Admitting: OBSTETRICS & GYNECOLOGY
Payer: COMMERCIAL

## 2022-11-25 PROBLEM — Z37.9 NORMAL LABOR: Status: ACTIVE | Noted: 2022-11-25

## 2022-11-25 LAB
A1 MICROGLOB PLACENTAL VAG QL: POSITIVE
ABO + RH BLD: NORMAL
BASOPHILS # BLD: 0 K/UL (ref 0–0.1)
BASOPHILS NFR BLD: 0 % (ref 0–1)
BLOOD GROUP ANTIBODIES SERPL: NORMAL
COMMENT, HOLDF: NORMAL
CONTROL LINE PRESENT?: NORMAL
DIFFERENTIAL METHOD BLD: ABNORMAL
EOSINOPHIL # BLD: 0.1 K/UL (ref 0–0.4)
EOSINOPHIL NFR BLD: 1 % (ref 0–7)
ERYTHROCYTE [DISTWIDTH] IN BLOOD BY AUTOMATED COUNT: 13.1 % (ref 11.5–14.5)
EXPIRATION DATE: NORMAL
HCT VFR BLD AUTO: 38.9 % (ref 35–47)
HGB BLD-MCNC: 13.9 G/DL (ref 11.5–16)
IMM GRANULOCYTES # BLD AUTO: 0 K/UL (ref 0–0.04)
IMM GRANULOCYTES NFR BLD AUTO: 0 % (ref 0–0.5)
INTERNAL NEGATIVE CONTROL: NORMAL
KIT LOT NO.: NORMAL
LYMPHOCYTES # BLD: 2.4 K/UL (ref 0.8–3.5)
LYMPHOCYTES NFR BLD: 22 % (ref 12–49)
MCH RBC QN AUTO: 34.7 PG (ref 26–34)
MCHC RBC AUTO-ENTMCNC: 35.7 G/DL (ref 30–36.5)
MCV RBC AUTO: 97 FL (ref 80–99)
MONOCYTES # BLD: 0.6 K/UL (ref 0–1)
MONOCYTES NFR BLD: 6 % (ref 5–13)
NEUTS SEG # BLD: 7.6 K/UL (ref 1.8–8)
NEUTS SEG NFR BLD: 71 % (ref 32–75)
NRBC # BLD: 0 K/UL (ref 0–0.01)
NRBC BLD-RTO: 0 PER 100 WBC
PLATELET # BLD AUTO: 268 K/UL (ref 150–400)
PMV BLD AUTO: 10.8 FL (ref 8.9–12.9)
RBC # BLD AUTO: 4.01 M/UL (ref 3.8–5.2)
SAMPLES BEING HELD,HOLD: NORMAL
SPECIMEN EXP DATE BLD: NORMAL
WBC # BLD AUTO: 10.7 K/UL (ref 3.6–11)

## 2022-11-25 PROCEDURE — 0HQ9XZZ REPAIR PERINEUM SKIN, EXTERNAL APPROACH: ICD-10-PCS | Performed by: OBSTETRICS & GYNECOLOGY

## 2022-11-25 PROCEDURE — 75410000003 HC RECOV DEL/VAG/CSECN EA 0.5 HR: Performed by: OBSTETRICS & GYNECOLOGY

## 2022-11-25 PROCEDURE — 75810000275 HC EMERGENCY DEPT VISIT NO LEVEL OF CARE

## 2022-11-25 PROCEDURE — 4A1HXCZ MONITORING OF PRODUCTS OF CONCEPTION, CARDIAC RATE, EXTERNAL APPROACH: ICD-10-PCS | Performed by: OBSTETRICS & GYNECOLOGY

## 2022-11-25 PROCEDURE — 77030014125 HC TY EPDRL BBMI -B: Performed by: ANESTHESIOLOGY

## 2022-11-25 PROCEDURE — 86900 BLOOD TYPING SEROLOGIC ABO: CPT

## 2022-11-25 PROCEDURE — 74011250636 HC RX REV CODE- 250/636: Performed by: OBSTETRICS & GYNECOLOGY

## 2022-11-25 PROCEDURE — 59025 FETAL NON-STRESS TEST: CPT

## 2022-11-25 PROCEDURE — 75410000000 HC DELIVERY VAGINAL/SINGLE: Performed by: OBSTETRICS & GYNECOLOGY

## 2022-11-25 PROCEDURE — 84112 EVAL AMNIOTIC FLUID PROTEIN: CPT | Performed by: OBSTETRICS & GYNECOLOGY

## 2022-11-25 PROCEDURE — 2709999900 HC NON-CHARGEABLE SUPPLY

## 2022-11-25 PROCEDURE — 99285 EMERGENCY DEPT VISIT HI MDM: CPT

## 2022-11-25 PROCEDURE — 74011250637 HC RX REV CODE- 250/637: Performed by: OBSTETRICS & GYNECOLOGY

## 2022-11-25 PROCEDURE — 85025 COMPLETE CBC W/AUTO DIFF WBC: CPT

## 2022-11-25 PROCEDURE — 76060000078 HC EPIDURAL ANESTHESIA: Performed by: ANESTHESIOLOGY

## 2022-11-25 PROCEDURE — 74011000250 HC RX REV CODE- 250: Performed by: ANESTHESIOLOGY

## 2022-11-25 PROCEDURE — 36415 COLL VENOUS BLD VENIPUNCTURE: CPT

## 2022-11-25 PROCEDURE — 74011250637 HC RX REV CODE- 250/637: Performed by: STUDENT IN AN ORGANIZED HEALTH CARE EDUCATION/TRAINING PROGRAM

## 2022-11-25 PROCEDURE — 65270000029 HC RM PRIVATE

## 2022-11-25 PROCEDURE — 75410000002 HC LABOR FEE PER 1 HR: Performed by: OBSTETRICS & GYNECOLOGY

## 2022-11-25 RX ORDER — SODIUM CHLORIDE 0.9 % (FLUSH) 0.9 %
5-40 SYRINGE (ML) INJECTION EVERY 8 HOURS
Status: DISCONTINUED | OUTPATIENT
Start: 2022-11-25 | End: 2022-11-25

## 2022-11-25 RX ORDER — SODIUM CHLORIDE, SODIUM LACTATE, POTASSIUM CHLORIDE, CALCIUM CHLORIDE 600; 310; 30; 20 MG/100ML; MG/100ML; MG/100ML; MG/100ML
125 INJECTION, SOLUTION INTRAVENOUS CONTINUOUS
Status: DISCONTINUED | OUTPATIENT
Start: 2022-11-25 | End: 2022-11-25

## 2022-11-25 RX ORDER — LIDOCAINE HYDROCHLORIDE AND EPINEPHRINE 15; 5 MG/ML; UG/ML
INJECTION, SOLUTION EPIDURAL
Status: SHIPPED | OUTPATIENT
Start: 2022-11-25 | End: 2022-11-25

## 2022-11-25 RX ORDER — OXYTOCIN/RINGER'S LACTATE 30/500 ML
0-20 PLASTIC BAG, INJECTION (ML) INTRAVENOUS
Status: DISCONTINUED | OUTPATIENT
Start: 2022-11-25 | End: 2022-11-25

## 2022-11-25 RX ORDER — OXYTOCIN/RINGER'S LACTATE 30/500 ML
87.3 PLASTIC BAG, INJECTION (ML) INTRAVENOUS AS NEEDED
Status: DISCONTINUED | OUTPATIENT
Start: 2022-11-25 | End: 2022-11-27 | Stop reason: HOSPADM

## 2022-11-25 RX ORDER — DIPHENHYDRAMINE HCL 25 MG
25 CAPSULE ORAL
Status: DISCONTINUED | OUTPATIENT
Start: 2022-11-25 | End: 2022-11-27 | Stop reason: HOSPADM

## 2022-11-25 RX ORDER — NALOXONE HYDROCHLORIDE 0.4 MG/ML
0.4 INJECTION, SOLUTION INTRAMUSCULAR; INTRAVENOUS; SUBCUTANEOUS AS NEEDED
Status: DISCONTINUED | OUTPATIENT
Start: 2022-11-25 | End: 2022-11-27 | Stop reason: HOSPADM

## 2022-11-25 RX ORDER — HYDROMORPHONE HYDROCHLORIDE 1 MG/ML
1 INJECTION, SOLUTION INTRAMUSCULAR; INTRAVENOUS; SUBCUTANEOUS
Status: DISCONTINUED | OUTPATIENT
Start: 2022-11-25 | End: 2022-11-27 | Stop reason: HOSPADM

## 2022-11-25 RX ORDER — SODIUM CHLORIDE 0.9 % (FLUSH) 0.9 %
5-40 SYRINGE (ML) INJECTION AS NEEDED
Status: DISCONTINUED | OUTPATIENT
Start: 2022-11-25 | End: 2022-11-27 | Stop reason: HOSPADM

## 2022-11-25 RX ORDER — OXYTOCIN/RINGER'S LACTATE 30/500 ML
10 PLASTIC BAG, INJECTION (ML) INTRAVENOUS AS NEEDED
Status: DISCONTINUED | OUTPATIENT
Start: 2022-11-25 | End: 2022-11-27 | Stop reason: HOSPADM

## 2022-11-25 RX ORDER — DOCUSATE SODIUM 100 MG/1
100 CAPSULE, LIQUID FILLED ORAL
Status: DISCONTINUED | OUTPATIENT
Start: 2022-11-25 | End: 2022-11-27 | Stop reason: HOSPADM

## 2022-11-25 RX ORDER — SODIUM CHLORIDE 0.9 % (FLUSH) 0.9 %
5-40 SYRINGE (ML) INJECTION
Status: DISCONTINUED | OUTPATIENT
Start: 2022-11-25 | End: 2022-11-27 | Stop reason: HOSPADM

## 2022-11-25 RX ORDER — BUPIVACAINE HYDROCHLORIDE 2.5 MG/ML
INJECTION, SOLUTION EPIDURAL; INFILTRATION; INTRACAUDAL
Status: SHIPPED | OUTPATIENT
Start: 2022-11-25 | End: 2022-11-25

## 2022-11-25 RX ORDER — IBUPROFEN 800 MG/1
800 TABLET ORAL EVERY 8 HOURS
Status: DISCONTINUED | OUTPATIENT
Start: 2022-11-25 | End: 2022-11-25

## 2022-11-25 RX ORDER — OXYTOCIN/RINGER'S LACTATE 30/500 ML
999 PLASTIC BAG, INJECTION (ML) INTRAVENOUS ONCE
Status: COMPLETED | OUTPATIENT
Start: 2022-11-25 | End: 2022-11-25

## 2022-11-25 RX ORDER — IBUPROFEN 800 MG/1
800 TABLET ORAL
Status: DISCONTINUED | OUTPATIENT
Start: 2022-11-25 | End: 2022-11-27 | Stop reason: HOSPADM

## 2022-11-25 RX ORDER — OXYCODONE AND ACETAMINOPHEN 5; 325 MG/1; MG/1
1 TABLET ORAL
Status: DISCONTINUED | OUTPATIENT
Start: 2022-11-25 | End: 2022-11-27 | Stop reason: HOSPADM

## 2022-11-25 RX ORDER — SIMETHICONE 80 MG
80 TABLET,CHEWABLE ORAL
Status: DISCONTINUED | OUTPATIENT
Start: 2022-11-25 | End: 2022-11-27 | Stop reason: HOSPADM

## 2022-11-25 RX ORDER — HYDROCORTISONE ACETATE PRAMOXINE HCL 2.5; 1 G/100G; G/100G
CREAM TOPICAL AS NEEDED
Status: DISCONTINUED | OUTPATIENT
Start: 2022-11-25 | End: 2022-11-25

## 2022-11-25 RX ORDER — FENTANYL/BUPIVACAINE/NS/PF 2-1250MCG
1-16 PREFILLED PUMP RESERVOIR EPIDURAL CONTINUOUS
Status: DISCONTINUED | OUTPATIENT
Start: 2022-11-25 | End: 2022-11-25

## 2022-11-25 RX ORDER — ONDANSETRON 2 MG/ML
4 INJECTION INTRAMUSCULAR; INTRAVENOUS
Status: DISCONTINUED | OUTPATIENT
Start: 2022-11-25 | End: 2022-11-27 | Stop reason: HOSPADM

## 2022-11-25 RX ADMIN — DOCUSATE SODIUM 100 MG: 100 CAPSULE, LIQUID FILLED ORAL at 19:10

## 2022-11-25 RX ADMIN — SODIUM CHLORIDE, POTASSIUM CHLORIDE, SODIUM LACTATE AND CALCIUM CHLORIDE 125 ML/HR: 600; 310; 30; 20 INJECTION, SOLUTION INTRAVENOUS at 06:40

## 2022-11-25 RX ADMIN — OXYTOCIN 14 MILLI-UNITS/MIN: 10 INJECTION INTRAVENOUS at 11:30

## 2022-11-25 RX ADMIN — Medication 12 ML/HR: at 13:59

## 2022-11-25 RX ADMIN — Medication 12 ML/HR: at 07:14

## 2022-11-25 RX ADMIN — SODIUM CHLORIDE, POTASSIUM CHLORIDE, SODIUM LACTATE AND CALCIUM CHLORIDE 500 ML/HR: 600; 310; 30; 20 INJECTION, SOLUTION INTRAVENOUS at 11:22

## 2022-11-25 RX ADMIN — OXYTOCIN 999 ML/HR: 10 INJECTION INTRAVENOUS at 14:59

## 2022-11-25 RX ADMIN — IBUPROFEN 800 MG: 800 TABLET, FILM COATED ORAL at 20:51

## 2022-11-25 RX ADMIN — BUPIVACAINE HYDROCHLORIDE 8 ML: 2.5 INJECTION, SOLUTION EPIDURAL; INFILTRATION; INTRACAUDAL; PERINEURAL at 06:29

## 2022-11-25 RX ADMIN — LIDOCAINE HYDROCHLORIDE AND EPINEPHRINE 3.5 ML: 15; 5 INJECTION, SOLUTION EPIDURAL at 06:25

## 2022-11-25 RX ADMIN — OXYTOCIN 2 MILLI-UNITS/MIN: 10 INJECTION INTRAVENOUS at 08:30

## 2022-11-25 NOTE — PROGRESS NOTES
Labor Note    Rachel Javier  [id number]  1985   37w4d      S:  Feeling comfortable with epidural     O:    Visit Vitals  BP (!) 94/52   Pulse 97   Temp 97.4 °F (36.3 °C)   Resp 16   Ht 5' 8\" (1.727 m)   Wt 92.1 kg (203 lb)   SpO2 97%   Breastfeeding No   BMI 30.87 kg/m²     Cervical Exam  Dilation (cm): 4  Eff: 50 %  Station: -2  Vaginal exam done by? : Juliocesar Mann RN    Patient Vitals for the past 4 hrs: Mode Fetal Heart Rate Variability Decelerations Accelerations RN Reviewed Strip? FHR Interventions   22 0800 External 135 6-25 BPM None Yes -- --   22 0755 -- -- -- -- -- -- Lateral Right   22 0730 External 135 6-25 BPM None Yes -- --   22 0700 External 135 6-25 BPM None Yes Yes --   22 0657 -- -- -- -- -- -- Lateral Left   22 0600 External 135 6-25 BPM None Yes Yes --   22 0530 External 125 6-25 BPM Variable Yes Yes --   22 0500 External 125 6-25 BPM Early;Variable Yes Yes --       A/P:  39 y.o. G5  @ 37w4d - TOLAC     Not reexamined. Not having regular ctx at this time. Will plan to start pitocin. Discussed r/b/potential complications of TOLAC including uterine rupture with maternal / fetal morbidity and mortality and she would like a trial of labor. Ample time was given for questions.             Cynthia Wilkinson MD  [facility]

## 2022-11-25 NOTE — PROGRESS NOTES
Labor Note    Danika Suggs  [id number]  1985   37w4d      S:  Feeling pressure with contractions     O:    Visit Vitals  BP (!) 94/52   Pulse 97   Temp 97.9 °F (36.6 °C)   Resp 16   Ht 5' 8\" (1.727 m)   Wt 92.1 kg (203 lb)   SpO2 97%   Breastfeeding No   BMI 30.87 kg/m²     Cervical Exam  Dilation (cm): 9  Eff: 100 %  Station: 0  Position: Posterior  Cervical Consistency: Soft  Vaginal exam done by? : Dr. Martínez Rayo    Patient Vitals for the past 4 hrs: Mode Fetal Heart Rate Variability Decelerations Accelerations FHR Interventions   22 1330 External 135 6-25 BPM Early Yes --   22 1315 External 135 6-25 BPM Early Yes --   22 1300 External 130 6-25 BPM Early Yes --   22 1245 External 135 6-25 BPM Early Yes --   22 1233 -- -- -- -- -- Lateral Left   22 1230 External 135 6-25 BPM Early Yes --   22 1215 External 135 6-25 BPM Early Yes --   22 1200 External 140 6-25 BPM Early;Variable Yes --   22 1145 External 145 6-25 BPM Early Yes --   22 1130 External 145 6-25 BPM Early Yes Lateral Left   22 1115 External 140 6-25 BPM Early Yes --   22 1100 External 145 6-25 BPM None Yes --   22 1045 External 145 6-25 BPM None Yes --   22 1034 -- -- -- -- -- Lateral Left   22 1030 External 140 6-25 BPM None Yes --   22 1015 External 130 6-25 BPM None Yes --       A/P:  39 y.o. G5  @ 37w4d - labor   1. [facility]/[place]  2. GBS neg / Rh+  3. Pitocin at 14   4. Pain control - epidural   5. Devan 1-2 hours, or prn. Expect .       Shawn Figueroa MD  [facility]

## 2022-11-25 NOTE — L&D DELIVERY NOTE
Delivery Summary    Patient: Juana Perez MRN: [id number]  SSN: [id number]    YOB: 1985  Age: 39 y.o. Sex: female       Information for the patient's :  Krzysztof Dennis, Male Bianca Child [[id number]]     Delivery Note:     Patient reached FD and pushed with good effort to deliver the fetal head in ALYCE position. No nuchal cord found. The anterior shoulder, followed by the posterior shoulder and the rest of the body then delivered easily. This was a VMI with Apgars of 9 and 9 at 1 and 5 minutes respectively, weight pending. The infant was placed on mom's abdomen. The cord was then double clamped and cut by the FOB. Cord blood was taken. The placenta followed spontaneously, intact, with 3VC. Pitocin was added to the IVF and the fundus was firm to palpation. The vagina, cervix and perineum were examined and a small 1st degree laceration was found and repaired with 3-0 chromic.  mL. No complications. Mom and baby doing well. Dr. Fidelina Muñiz delivering. Chrissy Friend MD  [facility]       Labor Events:    Labor: No    Steroids: None   Cervical Ripening Date/Time:       Cervical Ripening Type: None   Antibiotics During Labor: No   Rupture Identifier:      Rupture Date/Time: 2022 1:00 AM   Rupture Type:     Amniotic Fluid Volume:  Moderate    Amniotic Fluid Description:      Amniotic Fluid Odor: None    Induction: None       Induction Date/Time:        Indications for Induction:      Augmentation: None   Augmentation Date/Time:      Indications for Augmentation:     Labor complications: None       Additional complications:        Delivery Events:  Indications For Episiotomy:     Episiotomy: None   Perineal Laceration(s): 1st   Repaired: Yes   Periurethral Laceration Location:      Repaired:     Labial Laceration Location:     Repaired:     Sulcal Laceration Location:     Repaired:     Vaginal Laceration Location:     Repaired:     Cervical Laceration Location:     Repaired:     Repair Suture: Chromic 3-0   Number of Repair Packets: 1   Estimated Blood Loss (ml):  ml   Quantitative Blood Loss (ml)                Delivery Date: 2022    Delivery Time: 2:50 PM  Delivery Type: , Spontaneous  Sex:  Male    Gestational Age: 37w1d   Delivery Clinician:  Jennifer Saldaña  Living Status: Living   Delivery Location: [address]          APGARS  One minute Five minutes Ten minutes   Skin color: 1   1        Heart rate: 2   2        Grimace: 2   2        Muscle tone: 2   2        Breathin   2        Totals: 9   9            Presentation: Vertex    Position: Left Occiput Anterior  Resuscitation Method:        Meconium Stained:        Cord Information: 3 Vessels  Complications: None  Cord around:    Delayed cord clamping? Yes  Cord clamped date/time:   Disposition of Cord Blood: Lab    Blood Gases Sent?: No    Placenta:  Date/Time: 2022  2:58 PM  Removal: Expressed      Appearance: Normal;Intact; Other (comment)     [place] Measurements:  Birth Weight:        Birth Length:        Head Circumference:        Chest Circumference:       Abdominal Girth: Other Providers:   EVERT Sands;Arun DOMINGUEZ RACHEL M;DEL CID, COURTNEY A, Obstetrician;Primary Nurse;Primary  Nurse;Charge Nurse;Primary Nurse         Group B Strep:   Lab Results   Component Value Date/Time    GrBStrep, External negative 2022 12:00 AM     Information for the patient's :  Carina Patrick Marariadna Bottom [[id number]]   No results found for: ABORH, PCTABR, PCTDIG, BILI, ABORHEXT, ABORH   No results for input(s): PCO2CB, PO2CB, HCO3I, SO2I, IBD, PTEMPI, SPECTI, PHICB, ISITE, IDEV, IALLEN in the last 72 hours.

## 2022-11-25 NOTE — ANESTHESIA PREPROCEDURE EVALUATION
Relevant Problems   No relevant active problems     Past Medical History:   Diagnosis Date    Infertility, female     IVF with [facility] to conceive both pregnancies    Thyroid activity decreased 2018    Hypothyrodism     Anesthesia Evaluation    Physical Exam    Airway  Mallampati: II  TM Distance: 4 - 6 cm  Neck ROM: normal range of motion   Mouth opening: Normal     Cardiovascular    Rhythm: regular           Dental  No notable dental hx       Pulmonary  Breath sounds clear to auscultation               Abdominal         Other Findings            Anesthetic Plan    ASA: 2  Anesthesia type: epidural            Anesthetic plan and risks discussed with: Patient

## 2022-11-25 NOTE — PROGRESS NOTES
0700:  Bedside and Verbal shift change report given to Debi Gil and Roscoe Garduno Rn (oncoming nurse) by Shanti Leone (offgoing nurse). Report included the following information SBAR, Intake/Output, MAR, and Recent Results. 6445: Kevin Marrow from Dr. Jose Ward to start pitocin on pt. due to inadequate contraction pattern. 0825: Dr. Jose Ward at bedside with patient, discussing POC and the plan to start pitocin on pt. Pt. agreeable to the use of pit. 1445: Patient actively pushing. RN remains in continuous attendance at the bedside. Assessment & evaluation of fetal heart rate ongoing via continuous EFM. 1450: RN remained at bedside throughout pushing. EFM continuously assessed. Vaginal delivery of viable infant. 1721: TRANSFER - OUT REPORT:    Verbal report given to Vera Alvarez Rn (name) on [address]  being transferred to MIU (unit) for routine progression of care       Report consisted of patients Situation, Background, Assessment and   Recommendations(SBAR). Information from the following report(s) SBAR, Procedure Summary, Intake/Output, MAR, and Recent Results was reviewed with the receiving nurse. Lines:   Peripheral IV 11/25/22 Left Hand (Active)   Site Assessment Clean, dry, & intact 11/25/22 0722   Phlebitis Assessment 0 11/25/22 0722   Infiltration Assessment 0 11/25/22 0722   Dressing Status Clean, dry, & intact 11/25/22 0722   Dressing Type Tape;Transparent 11/25/22 0722   Hub Color/Line Status Infusing 11/25/22 0612        Opportunity for questions and clarification was provided.       Patient transported with:   Registered Nurse

## 2022-11-25 NOTE — ANESTHESIA PROCEDURE NOTES
Epidural Block    Patient location during procedure: OB  Start time: 11/25/2022 6:19 AM  End time: 11/25/2022 6:30 AM  Reason for block: labor epidural  Staffing  Performed: attending   Anesthesiologist: Demi Bauman MD  Preanesthetic Checklist  Completed: patient identified, IV checked, site marked, risks and benefits discussed, surgical consent, monitors and equipment checked, pre-op evaluation, timeout performed and fire risk safety assessment completed and verbalized  Block Placement  Patient position: sitting  Prep: ChloraPrep  Sterility prep: mask, gloves, drape and cap  Sedation level: no sedation  Patient monitoring: heart rate, frequent blood pressure checks and continuous pulse oximetry  Approach: midline  Location: lumbar  Lumbar location: L2-L3  Epidural  Loss of resistance technique: hanging drop and saline  Guidance: landmark technique  Needle  Needle type: Tuohy   Needle gauge: 17 G  Needle length: 10 cm  Needle insertion depth: 9 cm  Catheter type: end hole  Catheter size: 20 G  Catheter at skin depth: 14 cm  Catheter securement method: clear occlusive dressing and surgical tape  Test dose: negative  Medications Administered  lidocaine-EPINEPHrine (XYLOCAINE) 1.5 %-1:200,000 Epidural - Epidural   3.5 mL - 11/25/2022 6:25:00 AM  bupivacaine (PF) (MARCAINE) 0.25% Epidural - Epidural   8 mL - 11/25/2022 6:29:00 AM  Assessment  Number of attempts: 1  Procedure assessment: patient tolerated procedure well with no immediate complications  Additional Notes  Aseptic, atraumatic technique

## 2022-11-25 NOTE — DISCHARGE SUMMARY
Obstetrical Discharge Summary     Name: Asaf Nichols MRN: [id number]  SSN: [id number]    YOB: 1985  Age: 40 y.o. Sex: female      Allergies: Patient has no known allergies. Admit Date: 2022    Discharge Date: 2022     Admitting Physician: Kathy Espino MD     Attending Physician:  Demarcus Escalona DO     * Admission Diagnoses: Normal labor [O80, Z37.9]    * Discharge Diagnoses:   Information for the patient's :  Celina, Carina Ibarra [[id number]]   Delivery of a 3.255 kg male infant via [address], Spontaneous on 2022 at 2:50 PM  by Melissa Apodaca. Apgars were 9  and 9 .     1st degree labial laceration     Additional Diagnoses:   Hospital Problems as of 2022 Never Reviewed            Codes Class Noted - Resolved POA    Normal labor ICD-10-CM: O80, Z37.9  ICD-9-CM: 831  2022 - Present Unknown          Lab Results   Component Value Date/Time    ABO/Rh(D) O POSITIVE 2022 05:27 AM    Rubella, External immune 2022 12:00 AM    GrBStrep, External negative 2022 12:00 AM    ABO,Rh O Positive 2022 12:00 AM    There is no immunization history for the selected administration types on file for this patient. * Procedures: TOLAC          * Discharge Condition: good    * Hospital Course: Normal hospital course following the delivery. * Disposition: Home    Discharge Medications:   Current Discharge Medication List          * Follow-up Care/Patient Instructions:   Activity: Activity as tolerated  Diet: Regular Diet  Wound Care: As directed    Follow-up Information    None

## 2022-11-25 NOTE — PROGRESS NOTES
0820: Patient arriving onto the unit at this time with her , being transported by ED staff in a wheel chair. 0400: Patient being placed on the monitor at this time. FHR is reassuring and reactive. 0405: This RN collecting an amnisure. Amnisure is positive at this time. Verified with Familia Kramer RN.    4906: This RN calling Dr Preeti Varghese at this time about an epidural for the patient. 4222: Dr Preeti Varghese at the bedside at this time placing epidural. Timeout at this time. 7865: Test bolus received at this time. 9924: FHR is 146 after epidural placement. 0700: Bedside and Verbal shift change report given to YINKA Saucedo and EMILY Jean RN (oncoming nurse) by Shelbie Fatima RN (offgoing nurse). Report included the following information SBAR, Kardex, Procedure Summary, Intake/Output, MAR, Accordion, Recent Results, Med Rec Status, Alarm Parameters , and Quality Measures.

## 2022-11-25 NOTE — H&P
OB History & Physical    Name: Marjory Landau MRN: [id number]  SSN: [id number]    YOB: 1985  Age: 39 y.o. Sex: female      Subjective:     Reason for Admission:  Pregnancy and SROM    History of Present Illness: Marjory Landau is a 39 y.o.  female [id number] 38 weeks complains of leaking fluid and contractions. Denied vaginal bleeding, headache, nausea or vomiting. Pt requests a trial of TOLAC. Prenatal care was uneventful. OB History          5    Para   3    Term   2       1    AB   1    Living   4         SAB   1    IAB        Ectopic        Molar        Multiple   1    Live Births   4              Past Medical History:   Diagnosis Date    Infertility, female     IVF with [facility] to conceive both pregnancies    Thyroid activity decreased 2018    Hypothyrodism     Past Surgical History:   Procedure Laterality Date    HX  SECTION  2019    Di-Di twins, breech presentation    HX TONSILLECTOMY Bilateral     IA BREAST SURGERY PROCEDURE UNLISTED      breast implants      Social History     Occupational History    Not on file   Tobacco Use    Smoking status: Never    Smokeless tobacco: Never   Substance and Sexual Activity    Alcohol use: No    Drug use: No    Sexual activity: Yes     Partners: Male     Birth control/protection: None     Family History   Problem Relation Age of Onset    No Known Problems Mother     No Known Problems Father        No Known Allergies  Prior to Admission medications    Medication Sig Start Date End Date Taking? Authorizing Provider   PNV No12-Iron-FA-DSS-OM-3 29 mg iron-1 mg -50 mg CPKD Take  by mouth. Yes Provider, Historical        Review of Systems-See HPI    Objective:     Vitals:    Vitals:    22 0438   Weight: 92.1 kg (203 lb)   Height: 5' 8\" (1.727 m)      No data recorded.     No data recorded     Physical Exam  General: in NAD  HEENT: normocephalic  Back: no CVAT  Abdomen: Gravid, soft, term size,nontender, some guarding  Fundus: soft, nontender  Extremities: no abnormal cyanosis, clubbing, edema  Skin: warm, dry, no abnormal lesions noted  Pelvic:Cervical Exam: 50/4,-2  Uterine Activity: no contractions detected  Membranes: gross evidence of ROM  Fetal Heart Rate: adequate variability and reactivity; no significant abnormal decelerations    Labs: No results found for this or any previous visit (from the past 24 hour(s)). Patient Active Problem List   Diagnosis Code    Pregnancy Z34.90     labor in second trimester O60.02    Pregnant Z34.90    Normal labor O80, Z37.9     Assessment and Plan:   Impression: Previous  at term in labor. HX of previous TOLAC. Plan: Trial of TOLAC.         Signed By:  Luis Miguel Timmons MD     2022

## 2022-11-25 NOTE — DISCHARGE INSTRUCTIONS
Discharge Instructions for Vaginal Delivery    Patient ID:  Carmen Woods  [id number]  39 y.o.  1985    Take Home Medications       Continue taking your prenatal vitamins if you are breastfeeding. Follow-up care is a key part of your treatment and safety. Please schedule and keep appointments. Follow-up with your primary OB in 6 weeks. Activity  Avoid anything in your vagina for 6 weeks (no intercourse, tampons, or douching). You may drive unless you are taking prescription pain medications. Climbing stairs and light lifting are okay. Please avoid excessive exercise, though walking is okay- you'll be tired! Diet  Regular diet as tolerated. Be sure to drink plenty of fluids if you are breastfeeding. Wound care  If you have stitches, continue to rinse with a squirt bottle of warm water each time you void for about 7-10 days. .  Your stitches will gradually dissolve over four to eight weeks. Sitz baths are also helpful to keep the wound clean, encourage healing, and to help with pain associated with the stitches or hemorrhoids. You can use either a sitz bath basin or a bathtub filled with 2-3\" inches of plain warm water. Soak for 10 minutes 3 times a day as tolerated. Pain Management  Over the counter medications such as Tylenol and ibuprofen (Motrin or Advil) are ideal.  These may be taken together, alternating doses. You may  take the maximum dose:  Motrin or Advil (generic ibuprofen), either 3 tablets every 6 hours or 4 tablets every 8 hours or Tylenol (acetominophen) 1000mg every 6 hours (equivalent to 2 extra strength Tylenol). You may also have a precrescription for stronger pain medication. Take only as needed and transition to over the counter medication in the next few days. Minimize amounts of the prescription medication, as it can be habit-forming and will worsen or cause constipation.  Most patients will find that within a couple of days, their pain is adequately controlled using only over-the-counter medications. The prescription pain medication is mixed with Tylenol, therefore, you should not take any extra Tylenol or acetaminophen until you have reduced your prescription pain medication. Add heating pad or sitz baths as needed. Add hemorrhoid wipes or ointments if needed    Constipation  Constipation is normal after pregnancy and delivery, especially while taking prescription narcotic pain medication. Over the counter remedies including ducosate (Colace), take 1-2 capsules 1-2 times daily for soft stool as needed. You may also add/ try milk of magnesia or rectal remedies such as Dulcolax or Fleets enema. Recovery: What to Expect at Home  Fatigue is expected. Try to rest when you can and don't worry about doing housework or other tasks which can wait. The soreness along your bottom will improve significantly over the first 2 weeks, but it may take 6 weeks before you are completely recovered. Back pain or general body aches or muscle soreness are expected and should improve with acetominophen or ibuprofen. Leg swelling due to pregnancy and/or IV fluids given in the hospital will take about two weeks to resolve. Most women experience some form of the \"Baby Blues\" after having a baby. Feeling emotional, tearful, frustrated, anxious, sad, and irritable some of the time is normal and go away after about 2 weeks. Adequate rest and help from your family will help. Take breaks from caring for the baby. Call your doctor if your symptoms seem severe, last more than 2 weeks, or seem to be getting worse instead of better. Get help immediately if you have thoughts of wanting to hurt yourself or others! Call your doctor or seek immediate medical care if you have:  Heavy vaginal bleeding, soaking through one or more pads an hour for several hours. Foul-smelling discharge from your vagina or incision.   Consistent nausea and vomiting and cannot keep fluids down.  Consistent pain that does not get better after you take pain medicine.   Sudden chest pain and shortness of breath  Signs of a blood clot: pain/ swelling/ increasing redness in your lower extremeties  Signs of infection: increased pain in your abdomen or vaginal area; red streaks, warmth, or tenderness of your breasts; fever of 100.5 F or greater

## 2022-11-26 PROCEDURE — 65270000029 HC RM PRIVATE

## 2022-11-26 PROCEDURE — 2709999900 HC NON-CHARGEABLE SUPPLY

## 2022-11-26 PROCEDURE — 74011250637 HC RX REV CODE- 250/637: Performed by: OBSTETRICS & GYNECOLOGY

## 2022-11-26 PROCEDURE — 74011250637 HC RX REV CODE- 250/637: Performed by: STUDENT IN AN ORGANIZED HEALTH CARE EDUCATION/TRAINING PROGRAM

## 2022-11-26 RX ADMIN — MUPIROCIN: 20 OINTMENT TOPICAL at 12:39

## 2022-11-26 RX ADMIN — DOCUSATE SODIUM 100 MG: 100 CAPSULE, LIQUID FILLED ORAL at 19:49

## 2022-11-26 RX ADMIN — IBUPROFEN 800 MG: 800 TABLET, FILM COATED ORAL at 04:32

## 2022-11-26 RX ADMIN — OXYCODONE AND ACETAMINOPHEN 1 TABLET: 5; 325 TABLET ORAL at 12:40

## 2022-11-26 RX ADMIN — IBUPROFEN 800 MG: 800 TABLET, FILM COATED ORAL at 12:40

## 2022-11-26 RX ADMIN — OXYCODONE AND ACETAMINOPHEN 1 TABLET: 5; 325 TABLET ORAL at 04:32

## 2022-11-26 RX ADMIN — OXYCODONE AND ACETAMINOPHEN 1 TABLET: 5; 325 TABLET ORAL at 17:54

## 2022-11-26 RX ADMIN — IBUPROFEN 800 MG: 800 TABLET, FILM COATED ORAL at 20:31

## 2022-11-26 RX ADMIN — OXYCODONE AND ACETAMINOPHEN 1 TABLET: 5; 325 TABLET ORAL at 08:39

## 2022-11-26 RX ADMIN — OXYCODONE AND ACETAMINOPHEN 1 TABLET: 5; 325 TABLET ORAL at 00:29

## 2022-11-26 NOTE — PROGRESS NOTES
PostPartum Note    Valerie Sheridan  [id number]  1985  39 y.o.    S:  Ms. Valerie Sheridan is a 39 y.o.  PPD #1 s/p  @ 37w4d. Doing well. She had a baby boy. Her lochia is like a period. She describes her pain as mild and is well controlled with PO medications. She is breast feeding and this is going well. She is ambulating and voiding. Tolerating PO intake. O:   Visit Vitals  /62 (BP 1 Location: Right upper arm, BP Patient Position: At rest)   Pulse 87   Temp 98 °F (36.7 °C)   Resp 17   Ht 5' 8\" (1.727 m)   Wt 92.1 kg (203 lb)   SpO2 97%   Breastfeeding Unknown   BMI 30.87 kg/m²       Lab Results   Component Value Date/Time    WBC 10.7 2022 05:27 AM    HGB 13.9 2022 05:27 AM    HCT 38.9 2022 05:27 AM    PLATELET 545  05:27 AM    MCV 97.0 2022 05:27 AM    Hgb, External 13.8 10/17/2016 12:00 AM    Hct, External 41.8 10/17/2016 12:00 AM       Gen - No acute distress  Abdomen - Fundus firm, below the umbilicus   Ext - Warm, well perfused. Nontender    A/P:  PPD #1 s/p VBA @ 37w4d doing well. 1.  Routine PP instructions/ care discussed  2. Blood type - Rh +  3. Rubella imm  4. Circumcision declined    5. Discharge PPD#2    6. F/U 4-6 weeks for PP check.       Sil Dudley MD  [facility]

## 2022-11-26 NOTE — LACTATION NOTE
This note was copied from a baby's chart. Mother ambulatory in room. Mother states baby is BF well. Mother denies questions or needs with BF. Mother states this is her 3rd baby to BF. BF basics reviewed. Breast pump kit given. Showed mother how to use manual pump and how to alter tubing to use on Medela pump n style. Lanolin given. Discussed with mother her plan for feeding. Reviewed the benefits of exclusive breast milk feeding during the hospital stay. Informed her of the risks of using formula to supplement in the first few days of life as well as the benefits of successful breast milk feeding; referred her to the Breastfeeding booklet about this information. She acknowledges understanding of information reviewed and states that it is her plan to breastfeed her infant. Will support her choice and offer additional information as needed. Reviewed breastfeeding basics:  How milk is made and normal  breastfeeding behaviors discussed. Supply and demand,  stomach size, early feeding cues, skin to skin bonding with comfortable positioning and baby led latch-on reviewed. How to identify signs of successful breastfeeding sessions reviewed. Normal course of breastfeeding discussed including the AAP's recommendation that children receive exclusive breast milk feedings for the first six months of life with breast milk feedings to continue through the first year of life and/or beyond as complimentary table foods are added. Breastfeeding Booklet and Warm line information provided with discussion. Discussed typical  weight loss and the importance of pediatrician appointment within 24-48 hours of discharge, at 2 weeks of life and normalcy of requesting pediatric weight checks as needed in between visits. Pt will successfully establish breastfeeding by feeding in response to early feeding cues   or wake every 3h, will obtain deep latch, and will keep log of feedings/output. Taught to BF at hunger cues and or q 2-3 hrs and to offer 10-20 drops of hand expressed colostrum at any non-feeds.       Breast Assessment  Left Breast: Medium  Left Nipple: Everted, Intact  Right Breast: Medium  Right Nipple: Everted, Intact  Breast- Feeding Assessment  Attends Breast-Feeding Classes: No  Breast-Feeding Experience: Yes (3rd baby to BF)  Breast Trauma/Surgery: No  Type/Quality: Good  Lactation Consultant Visits  Breast-Feedings: Good   Mother/Infant Observation  Mother Observation: Breast comfortable

## 2022-11-27 VITALS
SYSTOLIC BLOOD PRESSURE: 95 MMHG | HEART RATE: 93 BPM | HEIGHT: 68 IN | OXYGEN SATURATION: 95 % | WEIGHT: 203 LBS | RESPIRATION RATE: 16 BRPM | TEMPERATURE: 98.1 F | DIASTOLIC BLOOD PRESSURE: 61 MMHG | BODY MASS INDEX: 30.77 KG/M2

## 2022-11-27 PROCEDURE — 2709999900 HC NON-CHARGEABLE SUPPLY

## 2022-11-27 PROCEDURE — 74011250637 HC RX REV CODE- 250/637: Performed by: STUDENT IN AN ORGANIZED HEALTH CARE EDUCATION/TRAINING PROGRAM

## 2022-11-27 PROCEDURE — 74011250637 HC RX REV CODE- 250/637: Performed by: OBSTETRICS & GYNECOLOGY

## 2022-11-27 RX ADMIN — IBUPROFEN 800 MG: 800 TABLET, FILM COATED ORAL at 05:38

## 2022-11-27 RX ADMIN — OXYCODONE AND ACETAMINOPHEN 1 TABLET: 5; 325 TABLET ORAL at 05:38

## 2022-11-27 RX ADMIN — OXYCODONE AND ACETAMINOPHEN 1 TABLET: 5; 325 TABLET ORAL at 11:35

## 2022-11-27 RX ADMIN — OXYCODONE AND ACETAMINOPHEN 1 TABLET: 5; 325 TABLET ORAL at 00:09

## 2022-11-27 NOTE — LACTATION NOTE
This note was copied from a baby's chart. Mother and baby for discharge. Mother states baby has been latching on and breastfeeding well. LC discussed the following:    Reviewed breastfeeding basics:  Supply and demand, breastfeed baby 8-12 times in 24 hours, feed on demand,  stomach size, early  Feeding cues, skin to skin, positioning and baby led latch-on, assymetrical latch with signs of good, deep latch vs shallow, feeding frequency and duration, and log sheet for tracking infant feedings and output. Breastfeeding Booklet and Warm line information given. Discussed typical  weight loss and the importance of infant weight checks with pediatrician 1-2 post discharge. Engorgement Care Guidelines:  Reviewed how milk is made and normal phases of milk production. Taught care of engorged breasts - frequent breastfeeding encouraged, cool packs and motrin as tolerated. Anticipatory guidance shared. Care for sore/tender nipples discussed:  ways to improve positioning and latch practiced and discussed, hand express colostrum after feedings and let air dry, light application of lanolin, hydrogel pads, seek comfortable laid back feeding position, start feedings on least sore side first.     Discussed eating a healthy diet. Instructed mother to eat a variety of foods in order to get a well balanced diet. She should consume an extra 500 calories per day (more than her non-pregnant requirement.) These extra calories will help provide energy needed for optimal breast milk production. Mother also encouraged to \"drink to thirst\" and it is recommended that she drink fluids such as water, fruit/vegetable juice. Nutritious snacks should be available so that she can eat throughout the day to help satisfy her hunger and maintain a good milk supply.      Pt will successfully establish breastfeeding by feeding in response to early feeding cues   or wake every 3h, will obtain deep latch, and will keep log of feedings/output. Taught to BF at hunger cues and or q 2-3 hrs and to offer 10-20 drops of hand expressed colostrum at any non-feeds. Breast Assessment  Left Breast: Medium (Breasts are getting full - milk is coming in)  Left Nipple: Everted, Intact  Right Breast: Medium  Right Nipple: Everted, Intact  Breast- Feeding Assessment  Attends Breast-Feeding Classes: No  Breast-Feeding Experience: Yes  Breast Trauma/Surgery: No  Type/Quality: Good (Per mother)  Lactation Consultant Visits  Breast-Feedings: Good  (Encouraged mother to call LC if she feeds again before discharge.)      Chart shows numerous feedings, void, stool WNL. Discussed importance of monitoring outputs and feedings on first week of life. Discussed ways to tell if baby is  getting enough breast milk, ie  voids and stools, change in color of stool, and return to birth wt within 2 weeks. Follow up with pediatrician visit for weight check in 1-2 days (per AAP guidelines.)  Encouraged to call Warm Line  [phone]  for any questions/problems that arise.  Mother also given breastfeeding support group dates and times for any future needs

## 2022-11-27 NOTE — PROGRESS NOTES
PostPartum Note    Danika Suggs  [id number]  1985  40 y.o.    S:  Ms. Danika Suggs is a 40 y.o.  PPD #2 s/p  @ 37w4d. Doing well. She had a baby boy. Her lochia is like a period. She describes her pain as mild and is well controlled with PO medications. She is breast feeding and this is going well. She is ambulating and voiding. Tolerating PO intake. O:   Visit Vitals  BP 95/61 (BP Patient Position: At rest)   Pulse 93   Temp 98.1 °F (36.7 °C)   Resp 16   Ht 5' 8\" (1.727 m)   Wt 92.1 kg (203 lb)   SpO2 95%   Breastfeeding Unknown   BMI 30.87 kg/m²       Lab Results   Component Value Date/Time    WBC 10.7 2022 05:27 AM    HGB 13.9 2022 05:27 AM    HCT 38.9 2022 05:27 AM    PLATELET 689  05:27 AM    MCV 97.0 2022 05:27 AM    Hgb, External 13.8 10/17/2016 12:00 AM    Hct, External 41.8 10/17/2016 12:00 AM       Gen - No acute distress  Abdomen - Fundus firm, below the umbilicus   Ext - Warm, well perfused. Nontender    A/P:  PPD #2 s/p  @ 37w4d doing well. 1.  Routine PP instructions/ care discussed  2. Blood type - Rh +  3. Rubella imm  4. Circumcision declined    5. Discharge today   6. F/U 4-6 weeks for PP check.       Shawn Figueroa MD  [facility]

## (undated) DEVICE — PACK PROCEDURE SURG C SECT KT SMH

## (undated) DEVICE — LARGE, DISPOSABLE ALEXIS O C-SECTION PROTECTOR - RETRACTOR: Brand: ALEXIS ® O C-SECTION PROTECTOR - RETRACTOR

## (undated) DEVICE — 3000CC GUARDIAN II: Brand: GUARDIAN

## (undated) DEVICE — Device: Brand: PORTEX

## (undated) DEVICE — CATH FOLEY 16F LUBRI-SIL IC --

## (undated) DEVICE — SUTURE VCRL SZ 0 L36IN ABSRB VLT L40MM CT 1/2 CIR J358H

## (undated) DEVICE — TIP CLEANER: Brand: VALLEYLAB

## (undated) DEVICE — SOLUTION IV 1000ML 0.9% SOD CHL

## (undated) DEVICE — REM POLYHESIVE ADULT PATIENT RETURN ELECTRODE: Brand: VALLEYLAB

## (undated) DEVICE — LIGHT HANDLE: Brand: DEVON

## (undated) DEVICE — SUTURE MCRYL SZ 2-0 L36IN ABSRB UD L36MM CT-1 1/2 CIR Y945H

## (undated) DEVICE — DEVON™ KNEE AND BODY STRAP 60" X 3" (1.5 M X 7.6 CM): Brand: DEVON

## (undated) DEVICE — ROYALSILK SURGICAL GOWN, L: Brand: CONVERTORS

## (undated) DEVICE — SUTURE MCRYL SZ 0 L36IN ABSRB UD L36MM CT-1 1/2 CIR Y946H

## (undated) DEVICE — MEDI-VAC NON-CONDUCTIVE SUCTION TUBING: Brand: CARDINAL HEALTH

## (undated) DEVICE — COVERALL PREM SMS 2XL KNIT --

## (undated) DEVICE — DRAPE FLD WRM W44XL66IN C6L FOR INTRATEMP SYS THERMABASIN

## (undated) DEVICE — DRSG AQUACEL SURG 3.5 X 10IN -- CONVERT TO ITEM 370183

## (undated) DEVICE — STERILE POLYISOPRENE POWDER-FREE SURGICAL GLOVES WITH EMOLLIENT COATING: Brand: PROTEXIS

## (undated) DEVICE — SOLIDIFIER MEDC 1200ML -- CONVERT TO 356117

## (undated) DEVICE — SOLUTION IRRIG 1000ML H2O STRL BLT

## (undated) DEVICE — STERILE POLYISOPRENE POWDER-FREE SURGICAL GLOVES: Brand: PROTEXIS

## (undated) DEVICE — KENDALL SCD EXPRESS SLEEVES, KNEE LENGTH, MEDIUM: Brand: KENDALL SCD

## (undated) DEVICE — SUTURE MCRYL SZ 4-0 L27IN ABSRB UD L24MM PS-1 3/8 CIR PRIM Y935H